# Patient Record
Sex: MALE | Race: ASIAN | NOT HISPANIC OR LATINO | Employment: OTHER | URBAN - METROPOLITAN AREA
[De-identification: names, ages, dates, MRNs, and addresses within clinical notes are randomized per-mention and may not be internally consistent; named-entity substitution may affect disease eponyms.]

---

## 2017-02-03 ENCOUNTER — OFFICE VISIT (OUTPATIENT)
Dept: INTERNAL MEDICINE | Facility: CLINIC | Age: 82
End: 2017-02-03

## 2017-02-03 VITALS
HEART RATE: 66 BPM | BODY MASS INDEX: 25.9 KG/M2 | WEIGHT: 165 LBS | SYSTOLIC BLOOD PRESSURE: 128 MMHG | DIASTOLIC BLOOD PRESSURE: 62 MMHG | OXYGEN SATURATION: 97 % | HEIGHT: 67 IN

## 2017-02-03 DIAGNOSIS — M75.82 ROTATOR CUFF TENDINITIS, LEFT: ICD-10-CM

## 2017-02-03 DIAGNOSIS — Z79.4 TYPE 2 DIABETES MELLITUS WITHOUT COMPLICATION, WITH LONG-TERM CURRENT USE OF INSULIN (HCC): Primary | ICD-10-CM

## 2017-02-03 DIAGNOSIS — Z12.11 SCREEN FOR COLON CANCER: ICD-10-CM

## 2017-02-03 DIAGNOSIS — E11.9 TYPE 2 DIABETES MELLITUS WITHOUT COMPLICATION, WITH LONG-TERM CURRENT USE OF INSULIN (HCC): Primary | ICD-10-CM

## 2017-02-03 LAB
GLUCOSE BLDC GLUCOMTR-MCNC: 193 MG/DL (ref 70–130)
HBA1C MFR BLD: 9.3 %

## 2017-02-03 PROCEDURE — 83036 HEMOGLOBIN GLYCOSYLATED A1C: CPT | Performed by: HOSPITALIST

## 2017-02-03 PROCEDURE — 99214 OFFICE O/P EST MOD 30 MIN: CPT | Performed by: HOSPITALIST

## 2017-02-03 PROCEDURE — 82947 ASSAY GLUCOSE BLOOD QUANT: CPT | Performed by: HOSPITALIST

## 2017-02-03 NOTE — PROGRESS NOTES
"Kelby Munoz is a 81 y.o. male.     HPI Comments: He is doing well. He has been having trouble with his left shoulder not being able to lift his arm and his shoulder is sore.       The following portions of the patient's history were reviewed and updated as appropriate: allergies, current medications, past family history, past medical history, past social history, past surgical history and problem list.    Review of Systems   Constitutional: Negative for activity change and appetite change.   HENT: Negative for congestion and dental problem.    Eyes: Negative for discharge and itching.   Respiratory: Negative for apnea and chest tightness.    Cardiovascular: Negative for chest pain and leg swelling.   Gastrointestinal: Negative for abdominal distention and abdominal pain.   Endocrine: Negative for cold intolerance and heat intolerance.   Genitourinary: Negative for difficulty urinating and dysuria.   Musculoskeletal: Positive for arthralgias.        Right shoulder   Neurological: Negative for dizziness and facial asymmetry.   Hematological: Negative for adenopathy. Does not bruise/bleed easily.   Psychiatric/Behavioral: Negative for agitation and behavioral problems.       Objective  Blood pressure 128/62, pulse 66, height 67\" (170.2 cm), weight 165 lb (74.8 kg), SpO2 97 %.      Physical Exam   Constitutional: He is oriented to person, place, and time. He appears well-developed and well-nourished.   HENT:   Head: Normocephalic and atraumatic.   Eyes: Conjunctivae and EOM are normal. Pupils are equal, round, and reactive to light.   Neck: Normal range of motion.   Cardiovascular: Normal rate, regular rhythm and normal heart sounds.    Pulmonary/Chest: Effort normal and breath sounds normal.   Abdominal: Soft. Bowel sounds are normal.   Musculoskeletal: Normal range of motion.   Neurological: He is alert and oriented to person, place, and time. He has normal reflexes.   Skin: Skin is warm and dry. "   Psychiatric: He has a normal mood and affect. His behavior is normal. Judgment and thought content normal.       Assessment/Plan   Rey was seen today for type 2 diabetes mellitus without complication, with long-ter, essential hypertension and medication question.    Diagnoses and all orders for this visit:    Type 2 diabetes mellitus without complication, with long-term current use of insulin  -     POC Glycosylated Hemoglobin (Hb A1C)  -     POC Glucose Fingerstick    Screen for colon cancer  -     Ambulatory Referral For Screening Colonoscopy    Rotator cuff tendinitis, left  -     Ambulatory Referral to Physical Therapy      Results for orders placed or performed in visit on 02/03/17   POC Glycosylated Hemoglobin (Hb A1C)   Result Value Ref Range    Hemoglobin A1C 9.3 %   POC Glucose Fingerstick   Result Value Ref Range    Glucose 193 (A) 70 - 130 mg/dL

## 2017-02-09 RX ORDER — INSULIN GLARGINE 100 [IU]/ML
INJECTION, SOLUTION SUBCUTANEOUS
Qty: 3 PEN | Refills: 0 | Status: SHIPPED | OUTPATIENT
Start: 2017-02-09 | End: 2017-05-22 | Stop reason: SDUPTHER

## 2017-02-17 ENCOUNTER — HOSPITAL ENCOUNTER (OUTPATIENT)
Dept: PHYSICAL THERAPY | Facility: HOSPITAL | Age: 82
Setting detail: THERAPIES SERIES
Discharge: HOME | End: 2017-02-17
Attending: HOSPITALIST

## 2017-02-17 DIAGNOSIS — M75.82 ROTATOR CUFF TENDONITIS, LEFT: Primary | ICD-10-CM

## 2017-02-17 PROCEDURE — G8984 CARRY CURRENT STATUS: HCPCS

## 2017-02-17 PROCEDURE — G8985 CARRY GOAL STATUS: HCPCS

## 2017-02-17 PROCEDURE — 97162 PT EVAL MOD COMPLEX 30 MIN: CPT | Performed by: PHYSICAL THERAPIST

## 2017-02-17 NOTE — PROGRESS NOTES
Outpatient Physical Therapy Ortho Initial Evaluation  [facility]     Patient Name: Rey Munoz  : 1935  MRN: [id number]  Today's Date: 2017      Visit Date: 2017    Patient Active Problem List   Diagnosis   • CAD (coronary artery disease)   • Ischemic cardiomyopathy   • Hypertension   • Dyslipidemia   • Type 2 diabetes mellitus   • PAD (peripheral artery disease)   • Ataxic gait   • Carotid artery stenosis   • Diabetic peripheral neuropathy   • Hyperlipidemia   • Lumbar radiculopathy        Past Medical History   Diagnosis Date   • Abnormal electrocardiogram    • Acute bronchitis    • Arthritis    • Diabetes mellitus    • H/O gastroesophageal reflux (GERD)    • History of colonoscopy    • History of esophagogastroduodenoscopy    • Hyperlipidemia    • Hypertension    • Vitamin D deficiency    • Weakness of foot         Past Surgical History   Procedure Laterality Date   • Cardiac catheterization  2013     for recurrent angina showed single-vessel disease with 75% stenosis of the proximal LAD.  The rest of the vessels are free from significant disease.  Ejection fraction was 60%. Successful stenting of the proximal LAD with 3.0 x 15 mm Resolute drug-eluting stent with excellent results.   Successful stenting of the proximal LAD with 3.0 x 15 mm Resolute drug-eluting stent with excellent results.      • Inguinal hernia repair       Remote bilateral inguinal hernia repair.   • Carotid endarterectomy        · Onset Date: 2014; Assessed By: Ruth Bhatt (Cardiothoracic Surgery); Last    Assessed: 01 Dec 2014       Visit Dx:     ICD-10-CM ICD-9-CM   1. Rotator cuff tendonitis, left M75.82 726.10             Patient History       17 1100          History    Chief Complaint Pain;Muscle weakness  -ROSEMARY      Type of Pain Shoulder pain   left  -ROSEMARY      Date Current Problem(s) Began 17  -ROSEMARY      Brief Description of Current Complaint This 81 year-old male reports left shoulder pain  for many years.  He is a retired  and he thinks the repetitive use of his arm may have contributed to symptoms.  He says his primary concern is limited motion in his left arm.  -ROSEMARY      Onset Date- PT 2-17-17  -ROSEMARY      Patient/Caregiver Goals Know what to do to help the symptoms;Improve mobility;Improve strength  -ROSEMARY      Hand Dominance right-handed  -ROSEMARY      Pain     Pain Location Shoulder   left  -ROSEMARY      Pain at Present 0  -ROSEMARY      Pain at Best 0  -ROSEMARY      Pain at Worst --   Pt. cannot say.  Primary concern is limited ROM and strength  -ROSEMARY      What Performance Factors Make the Current Problem(s) WORSE? Repeated motion of arm.  -ROSEMARY      What Performance Factors Make the Current Problem(s) BETTER? No pain at rest.  -ROSEMARY      Is your sleep disturbed? No  -ROSEMARY      Safety    Are you being hurt, hit, or frightened by anyone at home or in your life? No  -ROSEMARY      Are you being neglected by a caregiver No  -ROSEMARY        User Key  (r) = Recorded By, (t) = Taken By, (c) = Cosigned By    Initials Name Provider Type    ROSEMARY Sorensen PT Physical Therapist                PT Ortho       02/17/17 1100    Posture/Observations    Posture/Observations Comments Protracted bilateral scapulae  -ROSEMARY    Sensation    Sensation WNL? WNL  -ROSEMARY    Shoulder Girdle Accessory Motions    Posterior glide of humerus Left:;Right:  -ROSEMARY    Inferior glide of humerus Left:;Hypomobile  -ROSEMARY    Shoulder Impingement/Rotator Cuff Special Tests    Neer Impingement Test (RC Lesion vs. Bursitis) Left:;Positive  -ROSEMARY    Drop Arm Test (Full Thickness RC Lesion) Left:;Negative  -ROSEMARY    Belly Press (Subscapularis Lesion) Left:;Negative  -ROSEMARY    Left Shoulder    Flexion AROM Deficit 135  -ROSEMARY    ABduction AROM Deficit 118  -ROSEMARY    External Rotation AROM Deficit wnl  -ROSEMARY    Internal Rotation AROM Deficit approx. 45  -ROSEMARY    Right Shoulder    Flexion AROM Deficit 152  -ROSEMARY    ABduction AROM Deficit 146  -ROSEMARY    External Rotation AROM Deficit wnl  -ROSEMARY    Internal  Rotation AROM Deficit approx. 45  -ROSEMARY    Left Shoulder    Flexion Gross Movement (3/5) fair  -ROSEMARY    ABduction Gross Movement (3/5) fair  -ROSEMARY    Int Rotation Gross Movement (5/5) normal  -ROSEMARY    Ext Rotation Gross Movement (3-/5) fair minus  -ROSEMARY    Right Shoulder    Flexion Gross Movement (3+/5) fair plus  -ROSEMARY    ABduction Gross Movement (4-/5) good minus  -ROSEMARY    Int Rotation Gross Movement (5/5) normal  -ROSEMARY    Ext Rotation Gross Movement (4+/5) good plus  -ROSEMARY      User Key  (r) = Recorded By, (t) = Taken By, (c) = Cosigned By    Initials Name Provider Type    ROSEMARY Sorensen PT Physical Therapist                            Therapy Education       02/17/17 1146    Therapy Education    Given HEP  -ROSEMARY    Program New  -ROSEMARY    How Provided Verbal;Demonstration;Written  -ROSEMARY    Provided to Patient;Caregiver  -ROSEMARY    Level of Understanding Demonstrated  -ROSEMARY      User Key  (r) = Recorded By, (t) = Taken By, (c) = Cosigned By    Initials Name Provider Type    ROSEMARY Sorensen PT Physical Therapist                PT OP Goals       02/17/17 1100          PT Short Term Goals    STG Date to Achieve 03/03/17  -ROSEMARY      STG 1 Pt. demonstrates independence in HEP.  -ROSEMARY      STG 2 Pt. demonstrates L shoulder AROM to WNL's without pain.  -ROSEMARY      Long Term Goals    LTG Date to Achieve 03/17/17  -ROSEMARY      LTG 1 Pt. is independent in self-management of remaining deficits/symptoms.  -ROSEMARY      LTG 2 L shoulder strength is sufficient for ADL performance.  -ROSEMARY      LTG 3 Quick DASH score is improved by 5 points or more.  -ROSEMARY      Time Calculation    PT Goal Re-Cert Due Date 03/17/17  -ROSEMARY        User Key  (r) = Recorded By, (t) = Taken By, (c) = Cosigned By    Initials Name Provider Type    ROSEMARY Sorensen PT Physical Therapist                PT Assessment/Plan       02/17/17 1149          PT Assessment    Functional Limitations Limitations in functional capacity and performance  -ROSEMARY      Impairments Muscle  strength;Pain;Posture;Range of motion  -ROSEMARY      Assessment Comments Pt. presents with limited L shoulder ROM and strength.  He will benefit from PT intervention to address these deficits.  -ROSEMARY      Please refer to paper survey for additional self-reported information Yes  -ROSEMARY      Rehab Potential Good  -ROSEMARY      Patient/caregiver participated in establishment of treatment plan and goals Yes  -ROSEMARY      Patient would benefit from skilled therapy intervention Yes  -ROSEMARY      PT Plan    PT Frequency Other (comment)  -ROSEMARY      Predicted Duration of Therapy Intervention (days/wks) 1 visit per 2 weeks  -ROSEMARY      Planned CPT's? PT EVAL: 62590;PT THER PROC EA 15 MIN: 58226;PT NEUROMUSC RE-EDUCATION EA 15 MIN: 65486;PT MANUAL THERAPY EA 15 MIN: 85387;PT ULTRASOUND EA 15 MIN: 08275  -ROSEMARY      PT Plan Comments PT once per 2 weeks per pt. request.  -ROSEMARY        User Key  (r) = Recorded By, (t) = Taken By, (c) = Cosigned By    Initials Name Provider Type    ROSEMARY Sorensen, PT Physical Therapist                                    Outcome Measures       02/17/17 1100          Quick DASH    Open a tight or new jar. 1  -ROSEMARY      Do heavy household chores (e.g., wash walls, wash floors) 1  -ROSEMARY      Carry a shopping bag or briefcase 1  -ROSEMARY      Wash your back 1  -ROSEMARY      Use a knife to cut food 1  -ROSEMARY      Recreational activities in which you take some force or impact through your arm, should or hand (e.g. golf, hammering, tennis, etc.) 5  -ROSEMARY      During the past week, to what extent has your arm, shoulder, or hand problem interfered with your normal social activites with family, friends, neighbors or groups? 3  -ROSEMARY      During the past week, were you limited in your work or other regular daily activities as a result of your arm, shoulder or hand problem? 4  -ROSEMARY      Arm, Shoulder, or hand pain 2  -ROSEMARY      Tingling (pins and needles) in your arm, shoulder, or hand 1  -ROSEMARY      During the past week, how much difficulty have you had sleeping  because of the pain in your arm, shoulder or hand? 1  -ROSEMARY      Number of Questions Answered 11  -ROSEMARY      Quick DASH Score 22.73  -ROSEMARY      Functional Assessment    Outcome Measure Options Quick DASH  -ROSEMARY        User Key  (r) = Recorded By, (t) = Taken By, (c) = Cosigned By    Initials Name Provider Type    ROSEMARY Sorensen, PT Physical Therapist            Time Calculation:   Start Time: 1100     Therapy Charges for Today     Code Description Service Date Service Provider Modifiers Qty    [id number] HC PT EVAL MOD COMPLEXITY 4 2/17/2017 Danelle Sorensen, PT GP 1          PT G-Codes  Outcome Measure Options: Quick DASH         Danelle Sorensen, PT  2/17/2017

## 2017-02-21 RX ORDER — GLIPIZIDE 2.5 MG/1
2.5 TABLET, EXTENDED RELEASE ORAL DAILY
Qty: 90 TABLET | Refills: 3 | Status: SHIPPED | OUTPATIENT
Start: 2017-02-21 | End: 2018-01-22 | Stop reason: SDUPTHER

## 2017-03-03 ENCOUNTER — APPOINTMENT (OUTPATIENT)
Dept: PHYSICAL THERAPY | Facility: HOSPITAL | Age: 82
End: 2017-03-03

## 2017-03-17 ENCOUNTER — DOCUMENTATION (OUTPATIENT)
Dept: PHYSICAL THERAPY | Facility: HOSPITAL | Age: 82
End: 2017-03-17

## 2017-03-17 ENCOUNTER — HOSPITAL ENCOUNTER (OUTPATIENT)
Dept: PHYSICAL THERAPY | Facility: HOSPITAL | Age: 82
Setting detail: THERAPIES SERIES
End: 2017-03-17

## 2017-03-17 RX ORDER — CLOPIDOGREL BISULFATE 75 MG/1
75 TABLET ORAL DAILY
Qty: 30 TABLET | Refills: 0 | Status: SHIPPED | OUTPATIENT
Start: 2017-03-17 | End: 2017-04-17 | Stop reason: SDUPTHER

## 2017-03-17 NOTE — THERAPY DISCHARGE NOTE
Outpatient Physical Therapy Discharge Summary         Patient Name: Rey Munoz  : 1935  MRN: [id number]    Today's Date: 3/17/2017    Visit Dx:  No diagnosis found.          PT OP Goals       17 1000       PT Short Term Goals    STG Date to Achieve 17  -ROSEMARY     STG 1 Pt. demonstrates independence in HEP.  -ROSEMARY     STG 2 Pt. demonstrates L shoulder AROM to WNL's without pain.  -ROSEMARY     Long Term Goals    LTG Date to Achieve 17  -ROSEMARY     LTG 1 Pt. is independent in self-management of remaining deficits/symptoms.  -ROSEMARY     LTG 2 L shoulder strength is sufficient for ADL performance.  -ROSEMARY     LTG 3 Quick DASH score is improved by 5 points or more.  -ROSEMARY       User Key  (r) = Recorded By, (t) = Taken By, (c) = Cosigned By    Initials Name Provider Type    ROSEMARY Sorensen, PT Physical Therapist          OP PT Discharge Summary  Date of Discharge: 17  Reason for Discharge: other (comment) (DId not return after evaluation)  Outcomes Achieved: Other (Did not return for treatment.)  Discharge Destination: Home with home program      Time Calculation:                    Danelle Sorensen, PT  3/17/2017

## 2017-03-24 ENCOUNTER — APPOINTMENT (OUTPATIENT)
Dept: PHYSICAL THERAPY | Facility: HOSPITAL | Age: 82
End: 2017-03-24

## 2017-03-31 ENCOUNTER — APPOINTMENT (OUTPATIENT)
Dept: PHYSICAL THERAPY | Facility: HOSPITAL | Age: 82
End: 2017-03-31

## 2017-04-04 RX ORDER — CARVEDILOL 12.5 MG/1
12.5 TABLET ORAL 2 TIMES DAILY WITH MEALS
Qty: 60 TABLET | Refills: 5 | Status: SHIPPED | OUTPATIENT
Start: 2017-04-04 | End: 2017-10-31 | Stop reason: SDUPTHER

## 2017-04-17 RX ORDER — CLOPIDOGREL BISULFATE 75 MG/1
75 TABLET ORAL DAILY
Qty: 90 TABLET | Refills: 1 | Status: SHIPPED | OUTPATIENT
Start: 2017-04-17 | End: 2017-09-29 | Stop reason: SDUPTHER

## 2017-05-22 ENCOUNTER — OFFICE VISIT (OUTPATIENT)
Dept: INTERNAL MEDICINE | Facility: CLINIC | Age: 82
End: 2017-05-22

## 2017-05-22 VITALS
SYSTOLIC BLOOD PRESSURE: 124 MMHG | HEIGHT: 67 IN | DIASTOLIC BLOOD PRESSURE: 54 MMHG | OXYGEN SATURATION: 98 % | WEIGHT: 165 LBS | HEART RATE: 68 BPM | BODY MASS INDEX: 25.9 KG/M2

## 2017-05-22 DIAGNOSIS — E11.9 TYPE 2 DIABETES MELLITUS WITHOUT COMPLICATION, WITH LONG-TERM CURRENT USE OF INSULIN (HCC): Primary | ICD-10-CM

## 2017-05-22 DIAGNOSIS — Z79.4 TYPE 2 DIABETES MELLITUS WITHOUT COMPLICATION, WITH LONG-TERM CURRENT USE OF INSULIN (HCC): Primary | ICD-10-CM

## 2017-05-22 DIAGNOSIS — I10 ESSENTIAL HYPERTENSION: ICD-10-CM

## 2017-05-22 DIAGNOSIS — E78.49 OTHER HYPERLIPIDEMIA: ICD-10-CM

## 2017-05-22 DIAGNOSIS — E11.42 DIABETIC PERIPHERAL NEUROPATHY (HCC): ICD-10-CM

## 2017-05-22 LAB
GLUCOSE BLDC GLUCOMTR-MCNC: 151 MG/DL (ref 70–130)
HBA1C MFR BLD: 7.2 %

## 2017-05-22 PROCEDURE — 99214 OFFICE O/P EST MOD 30 MIN: CPT | Performed by: HOSPITALIST

## 2017-05-22 PROCEDURE — 82947 ASSAY GLUCOSE BLOOD QUANT: CPT | Performed by: HOSPITALIST

## 2017-05-22 PROCEDURE — 83036 HEMOGLOBIN GLYCOSYLATED A1C: CPT | Performed by: HOSPITALIST

## 2017-05-22 RX ORDER — AMLODIPINE BESYLATE 5 MG/1
5 TABLET ORAL DAILY
Qty: 90 TABLET | Refills: 1 | Status: SHIPPED | OUTPATIENT
Start: 2017-05-22 | End: 2018-01-22 | Stop reason: SDUPTHER

## 2017-05-22 RX ORDER — ERYTHROMYCIN 5 MG/G
OINTMENT OPHTHALMIC
COMMUNITY
Start: 2017-05-16 | End: 2017-06-16

## 2017-05-22 RX ORDER — AMLODIPINE BESYLATE 10 MG/1
1 TABLET ORAL DAILY
COMMUNITY
Start: 2017-03-28 | End: 2017-05-22 | Stop reason: SDUPTHER

## 2017-06-16 ENCOUNTER — OFFICE VISIT (OUTPATIENT)
Dept: CARDIOLOGY | Facility: CLINIC | Age: 82
End: 2017-06-16

## 2017-06-16 VITALS
SYSTOLIC BLOOD PRESSURE: 128 MMHG | DIASTOLIC BLOOD PRESSURE: 62 MMHG | WEIGHT: 164.4 LBS | BODY MASS INDEX: 25.8 KG/M2 | HEART RATE: 82 BPM | HEIGHT: 67 IN

## 2017-06-16 DIAGNOSIS — I25.10 CORONARY ARTERY DISEASE INVOLVING NATIVE CORONARY ARTERY OF NATIVE HEART WITHOUT ANGINA PECTORIS: Primary | ICD-10-CM

## 2017-06-16 DIAGNOSIS — I10 ESSENTIAL HYPERTENSION: ICD-10-CM

## 2017-06-16 DIAGNOSIS — I25.5 ISCHEMIC CARDIOMYOPATHY: ICD-10-CM

## 2017-06-16 DIAGNOSIS — E78.2 MIXED HYPERLIPIDEMIA: ICD-10-CM

## 2017-06-16 PROCEDURE — 99213 OFFICE O/P EST LOW 20 MIN: CPT | Performed by: INTERNAL MEDICINE

## 2017-06-16 NOTE — PROGRESS NOTES
Encounter Date:06/16/2017      Patient ID: Rey Munoz is a 82 y.o. male.    Chief Complaint: Follow-up (CAD/HTN)    PROBLEM LIST:    1.  Coronary artery disease/ischemic cardiomyopathy:  a. Abnormal Cardiolite  stress test.  b. Cardiac catheterization study by Dr. Vora, 11/26/2012:  Showed single-vessel disease with 80% stenosis of the proximal LAD, nonobstructive disease of the circumflex and RCA with moderate LV dysfunction and EF of 35%.  c. Stenting of  the LAD with 3.0 x 18 mm Promus drug-eluting stent.  d. Echocardiogram, 03/29/2013, showed LVEF improved to 40% to 45%.    e. Cardiac catheterization, 12/19/2013, for recurrent angina showed single-vessel disease with 75% stenosis of the proximal LAD.   The rest of the vessels are free from significant disease.  Ejection fraction was 60%.  f. Successful stenting of the proximal LAD with 3.0 x 15 mm Resolute drug-eluting stent with excellent results.     g. Nuclear Stress 05/17/2016: EF 70%. NL with no evidence of infarction or regadenoson-induced reversible ischemia  h. Echocardiogram 05/17/2016. EF 55-60%. Mild MR, trace TR.  i. SHARRI 05/17/2016 : Positive claudication on the right and negative claudication and left. Post exercise Right extremity changed from 0.82 to .7, and left extremity changed from 0.82 to 0.98.  2. Carotid artery disease:  a. Right carotid bruit.  b. Carotid duplex showed 60% stenosis of the right external carotid artery.  No internal carotid artery disease noted.  c. Status post right CEA.  3. Hypertension.  4. Dyslipidemia.  5. Remote tobacco abuse with cessation 15 years ago.  6. Type 2 diabetes mellitus.  7. Remote bilateral inguinal hernia repair.  8. Arthritis.  9. History of gastroesophageal reflux disease.    History of Present Illness  Patient presents today for follow-up of CAD/ICM and cardiac risk factors. Recently returned from a [place] enjoyed a lot of walking and sightseeing, with no limitations. Wife states he also  suffered a recent URIFrom which she is still recovering. Denies chest pain, shortness of breath, leg swelling, palpitations, and syncope. Remains busy and active walking daily.    No Known Allergies      Current Outpatient Prescriptions:   •  ACCU-CHEK FASTCLIX LANCETS misc, 1 stick 2 (Two) Times a Day., Disp: , Rfl:   •  amLODIPine (NORVASC) 5 MG tablet, Take 1 tablet by mouth Daily., Disp: 90 tablet, Rfl: 1  •  aspirin 81 MG tablet, Take 81 mg by mouth daily, Disp: , Rfl:   •  atorvastatin (LIPITOR) 80 MG tablet, Take 1 tablet by mouth daily., Disp: 90 tablet, Rfl: 3  •  carvedilol (COREG) 12.5 MG tablet, Take 1 tablet by mouth 2 (Two) Times a Day With Meals., Disp: 60 tablet, Rfl: 5  •  clopidogrel (PLAVIX) 75 MG tablet, Take 1 tablet by mouth Daily., Disp: 90 tablet, Rfl: 1  •  glipiZIDE (GLUCOTROL) 2.5 MG 24 hr tablet, Take 1 tablet by mouth Daily., Disp: 90 tablet, Rfl: 3  •  Insulin Glargine (LANTUS SOLOSTAR) 100 UNIT/ML injection pen, Inject 36 Units under the skin Every Night., Disp: 10 pen, Rfl: 2  •  losartan (COZAAR) 100 MG tablet, Take 100 mg by mouth daily, Disp: , Rfl:   •  metFORMIN (GLUCOPHAGE) 1000 MG tablet, Take 0.5 tablets by mouth 2 (Two) Times a Day With Meals. (Patient taking differently: Take 500 mg by mouth Daily.), Disp: 180 tablet, Rfl: 3  •  nitroglycerin (NITROSTAT) 0.4 MG SL tablet, Place 1 tablet under the tongue As Needed., Disp: , Rfl:   •  glucose blood (ACCU-CHEK SMARTVIEW) test strip, 1 strip 2 (Two) Times a Day., Disp: , Rfl:     The following portions of the patient's history were reviewed and updated as appropriate: allergies, current medications, past family history, past medical history, past social history, past surgical history and problem list.    ROS  Review of Systems   Constitution: Negative for chills, fever, weight gain and weight loss.   Cardiovascular: Negative for chest pain, claudication, dyspnea on exertion, leg swelling, orthopnea, palpitations, paroxysmal  "nocturnal dyspnea and syncope.        No dizziness   Gastrointestinal: Negative for abdominal pain, constipation, diarrhea, nausea and vomiting.   Genitourinary:        No urinary symptoms   Neurological:        No symptoms of stroke.   All other systems reviewed and are negative.    Objective:     Blood pressure 128/62, pulse 82, height 67\" (170.2 cm), weight 164 lb 6.4 oz (74.6 kg).      Physical Exam  Constitutional: She appears well-developed and well-nourished.   HENT:   HEENT exam unremarkable.   Neck: Neck supple. No JVD present.   No carotid bruits.   Cardiovascular: Normal rate, regular rhythm and normal heart sounds.    No murmur heard.  2 plus symmetric pulses.   Pulmonary/Chest: Breath sounds normal. Does not exhibit tenderness.   Abdominal:   Abdomen benign.   Musculoskeletal: Does not exhibit edema.   Neurological:   Neurological exam unremarkable.   Vitals reviewed.    Lab Review:   Procedures       Assessment:      Diagnosis Plan   1. Coronary artery disease involving native coronary artery of native heart without angina pectoris     2. Ischemic cardiomyopathy subsequent improvement of LV function, stable/no CHF.      3. Essential hypertension , Well controlled     4. Mixed hyperlipidemia , On statin therapy.        Plan:   Stable from cardiac standpoint.  Continue current medications.   FU in 6 MO, sooner as needed.  Thank you for allowing us to participate in the care of your patient.     Scribed for She Vora MD by Bebeto Glez. 6/16/2017  11:49 AM    I, She Vora MD, personally performed the services described in this documentation as scribed by the above named individual in my presence, and it is both accurate and complete.  6/16/2017  11:50 AM        Please note that portions of this note may have been completed with a voice recognition program. Efforts were made to edit the dictations, but occasionally words are mistranscribed.        "

## 2017-06-28 RX ORDER — ATORVASTATIN CALCIUM 80 MG/1
TABLET, FILM COATED ORAL
Qty: 90 TABLET | Refills: 0 | Status: SHIPPED | OUTPATIENT
Start: 2017-06-28 | End: 2017-08-22 | Stop reason: SDUPTHER

## 2017-06-28 RX ORDER — ATORVASTATIN CALCIUM 80 MG/1
80 TABLET, FILM COATED ORAL DAILY
Qty: 90 TABLET | Refills: 0 | Status: SHIPPED | OUTPATIENT
Start: 2017-06-28 | End: 2017-09-29 | Stop reason: SDUPTHER

## 2017-07-05 RX ORDER — LOSARTAN POTASSIUM 100 MG/1
100 TABLET ORAL DAILY
Status: CANCELLED | OUTPATIENT
Start: 2017-07-05

## 2017-07-06 RX ORDER — LOSARTAN POTASSIUM 100 MG/1
100 TABLET ORAL DAILY
Qty: 90 TABLET | Refills: 1 | Status: SHIPPED | OUTPATIENT
Start: 2017-07-06 | End: 2017-12-31 | Stop reason: SDUPTHER

## 2017-08-22 ENCOUNTER — OFFICE VISIT (OUTPATIENT)
Dept: INTERNAL MEDICINE | Facility: CLINIC | Age: 82
End: 2017-08-22

## 2017-08-22 VITALS
DIASTOLIC BLOOD PRESSURE: 64 MMHG | SYSTOLIC BLOOD PRESSURE: 130 MMHG | BODY MASS INDEX: 25.21 KG/M2 | OXYGEN SATURATION: 98 % | WEIGHT: 160.6 LBS | HEIGHT: 67 IN | HEART RATE: 62 BPM

## 2017-08-22 DIAGNOSIS — E78.2 MIXED HYPERLIPIDEMIA: ICD-10-CM

## 2017-08-22 DIAGNOSIS — E11.9 TYPE 2 DIABETES MELLITUS WITHOUT COMPLICATION, WITH LONG-TERM CURRENT USE OF INSULIN (HCC): ICD-10-CM

## 2017-08-22 DIAGNOSIS — Z79.4 TYPE 2 DIABETES MELLITUS WITHOUT COMPLICATION, WITH LONG-TERM CURRENT USE OF INSULIN (HCC): ICD-10-CM

## 2017-08-22 DIAGNOSIS — I10 ESSENTIAL HYPERTENSION: ICD-10-CM

## 2017-08-22 DIAGNOSIS — E11.42 DIABETIC PERIPHERAL NEUROPATHY (HCC): Primary | ICD-10-CM

## 2017-08-22 LAB
ALBUMIN SERPL-MCNC: 4.5 G/DL (ref 3.2–4.8)
ALBUMIN/GLOB SERPL: 1.6 G/DL (ref 1.5–2.5)
ALP SERPL-CCNC: 62 U/L (ref 25–100)
ALT SERPL W P-5'-P-CCNC: 27 U/L (ref 7–40)
ANION GAP SERPL CALCULATED.3IONS-SCNC: 2 MMOL/L (ref 3–11)
ARTICHOKE IGE QN: 43 MG/DL (ref 0–130)
AST SERPL-CCNC: 22 U/L (ref 0–33)
BILIRUB SERPL-MCNC: 0.8 MG/DL (ref 0.3–1.2)
BUN BLD-MCNC: 23 MG/DL (ref 9–23)
BUN/CREAT SERPL: 23 (ref 7–25)
CALCIUM SPEC-SCNC: 10 MG/DL (ref 8.7–10.4)
CHLORIDE SERPL-SCNC: 106 MMOL/L (ref 99–109)
CHOLEST SERPL-MCNC: 98 MG/DL (ref 0–200)
CO2 SERPL-SCNC: 34 MMOL/L (ref 20–31)
CREAT BLD-MCNC: 1 MG/DL (ref 0.6–1.3)
GFR SERPL CREATININE-BSD FRML MDRD: 72 ML/MIN/1.73
GFR SERPL CREATININE-BSD FRML MDRD: 87 ML/MIN/1.73
GLOBULIN UR ELPH-MCNC: 2.9 GM/DL
GLUCOSE BLD-MCNC: 93 MG/DL (ref 70–100)
GLUCOSE BLDC GLUCOMTR-MCNC: 105 MG/DL (ref 70–130)
HBA1C MFR BLD: 6.4 %
HDLC SERPL-MCNC: 23 MG/DL (ref 40–60)
POTASSIUM BLD-SCNC: 4 MMOL/L (ref 3.5–5.5)
PROT SERPL-MCNC: 7.4 G/DL (ref 5.7–8.2)
SODIUM BLD-SCNC: 142 MMOL/L (ref 132–146)
TRIGL SERPL-MCNC: 151 MG/DL (ref 0–150)

## 2017-08-22 PROCEDURE — 82947 ASSAY GLUCOSE BLOOD QUANT: CPT | Performed by: NURSE PRACTITIONER

## 2017-08-22 PROCEDURE — 83036 HEMOGLOBIN GLYCOSYLATED A1C: CPT | Performed by: NURSE PRACTITIONER

## 2017-08-22 PROCEDURE — 80053 COMPREHEN METABOLIC PANEL: CPT | Performed by: NURSE PRACTITIONER

## 2017-08-22 PROCEDURE — 80061 LIPID PANEL: CPT | Performed by: NURSE PRACTITIONER

## 2017-08-22 PROCEDURE — 99214 OFFICE O/P EST MOD 30 MIN: CPT | Performed by: NURSE PRACTITIONER

## 2017-08-22 NOTE — PROGRESS NOTES
Subjective  Diabetes (F/u visit for type 2 diabetes, blood sugars ranging in the 130's. Pt stated he does not check blood sugars regularly); Hypertension; and Hyperlipidemia      Rey Munoz is a 82 y.o. male.   No Known Allergies  Diabetes   He presents for his follow-up diabetic visit. He has type 2 diabetes mellitus. No MedicAlert identification noted. His disease course has been stable. There are no hypoglycemic associated symptoms. There are no diabetic associated symptoms. There are no hypoglycemic complications. Symptoms are stable. There are no diabetic complications. Pertinent negatives for diabetic complications include no PVD. Risk factors for coronary artery disease include diabetes mellitus, dyslipidemia, hypertension and male sex. Current diabetic treatment includes oral agent (dual therapy). He is compliant with treatment most of the time. His weight is stable. He is following a generally unhealthy diet. When asked about meal planning, he reported none. He has not had a previous visit with a dietitian. He participates in exercise weekly. He sees a podiatrist.Eye exam is current.   Hypertension   This is a chronic problem. The current episode started more than 1 year ago. The problem is controlled. Pertinent negatives include no peripheral edema or shortness of breath. There are no associated agents to hypertension. Risk factors for coronary artery disease include male gender, diabetes mellitus and dyslipidemia. Past treatments include angiotensin blockers, beta blockers and calcium channel blockers. The current treatment provides significant improvement. Compliance problems include diet.  There is no history of PVD or renovascular disease.   Hyperlipidemia   This is a chronic problem. The current episode started more than 1 year ago. Exacerbating diseases include diabetes. He has no history of obesity. Pertinent negatives include no myalgias or shortness of breath. Current antihyperlipidemic treatment  "includes statins. Compliance problems include adherence to diet.  Risk factors for coronary artery disease include diabetes mellitus, dyslipidemia, male sex and hypertension.         Does not check bs regularly   The following portions of the patient's history were reviewed and updated as appropriate: allergies, current medications, past family history, past medical history, past social history, past surgical history and problem list.    Review of Systems   Respiratory: Negative.  Negative for shortness of breath.    Genitourinary: Negative.    Musculoskeletal: Negative for myalgias.   All other systems reviewed and are negative.      Objective   Physical Exam   Constitutional: He is oriented to person, place, and time. He appears well-developed.   HENT:   Head: Normocephalic.   Eyes: Conjunctivae are normal.   Cardiovascular: Normal rate and regular rhythm.    Pulmonary/Chest: Effort normal and breath sounds normal.   Neurological: He is alert and oriented to person, place, and time.   Skin: Skin is warm and dry.   Psychiatric: He has a normal mood and affect. His behavior is normal. Judgment and thought content normal.   Nursing note and vitals reviewed.    /64  Pulse 62  Ht 67\" (170.2 cm)  Wt 160 lb 9.6 oz (72.8 kg)  SpO2 98%  BMI 25.15 kg/m2    Assessment/Plan     Problem List Items Addressed This Visit        Cardiovascular and Mediastinum    Hypertension    Relevant Orders    Comprehensive Metabolic Panel    Hyperlipidemia    Relevant Orders    Lipid Panel       Endocrine    Type 2 diabetes mellitus    Relevant Orders    Comprehensive Metabolic Panel    Diabetic peripheral neuropathy - Primary    Relevant Orders    POC Glycosylated Hemoglobin (Hb A1C) (Completed)    POCT Glucose (Completed)        Results for orders placed or performed in visit on 08/22/17   POC Glycosylated Hemoglobin (Hb A1C)   Result Value Ref Range    Hemoglobin A1C 6.4 %   POCT Glucose   Result Value Ref Range    Glucose 105 70 " - 130 mg/dL       Will review labs and send to pt with results and poc

## 2017-09-29 RX ORDER — CLOPIDOGREL BISULFATE 75 MG/1
75 TABLET ORAL DAILY
Qty: 90 TABLET | Refills: 1 | Status: SHIPPED | OUTPATIENT
Start: 2017-09-29

## 2017-09-29 RX ORDER — ATORVASTATIN CALCIUM 80 MG/1
80 TABLET, FILM COATED ORAL DAILY
Qty: 90 TABLET | Refills: 0 | Status: SHIPPED | OUTPATIENT
Start: 2017-09-29 | End: 2018-03-10 | Stop reason: SDUPTHER

## 2017-10-31 RX ORDER — CARVEDILOL 12.5 MG/1
12.5 TABLET ORAL 2 TIMES DAILY WITH MEALS
Qty: 60 TABLET | Refills: 5 | Status: SHIPPED | OUTPATIENT
Start: 2017-10-31 | End: 2018-01-22 | Stop reason: SDUPTHER

## 2017-12-01 ENCOUNTER — HOSPITAL ENCOUNTER (OUTPATIENT)
Dept: GENERAL RADIOLOGY | Facility: HOSPITAL | Age: 82
Discharge: HOME | End: 2017-12-01
Admitting: NURSE PRACTITIONER

## 2017-12-01 ENCOUNTER — OFFICE VISIT (OUTPATIENT)
Dept: INTERNAL MEDICINE | Facility: CLINIC | Age: 82
End: 2017-12-01

## 2017-12-01 VITALS
HEIGHT: 67 IN | DIASTOLIC BLOOD PRESSURE: 66 MMHG | BODY MASS INDEX: 26.37 KG/M2 | SYSTOLIC BLOOD PRESSURE: 128 MMHG | WEIGHT: 168 LBS | OXYGEN SATURATION: 98 % | HEART RATE: 62 BPM

## 2017-12-01 DIAGNOSIS — M25.511 ACUTE PAIN OF RIGHT SHOULDER: ICD-10-CM

## 2017-12-01 DIAGNOSIS — I10 ESSENTIAL HYPERTENSION: ICD-10-CM

## 2017-12-01 DIAGNOSIS — Z79.4 TYPE 2 DIABETES MELLITUS WITHOUT COMPLICATION, WITH LONG-TERM CURRENT USE OF INSULIN (HCC): Primary | ICD-10-CM

## 2017-12-01 DIAGNOSIS — E11.9 TYPE 2 DIABETES MELLITUS WITHOUT COMPLICATION, WITH LONG-TERM CURRENT USE OF INSULIN (HCC): Primary | ICD-10-CM

## 2017-12-01 LAB
GLUCOSE BLDC GLUCOMTR-MCNC: 304 MG/DL (ref 70–130)
HBA1C MFR BLD: 7.3 %

## 2017-12-01 PROCEDURE — 73030 X-RAY EXAM OF SHOULDER: CPT

## 2017-12-01 PROCEDURE — 82947 ASSAY GLUCOSE BLOOD QUANT: CPT | Performed by: NURSE PRACTITIONER

## 2017-12-01 PROCEDURE — 83036 HEMOGLOBIN GLYCOSYLATED A1C: CPT | Performed by: NURSE PRACTITIONER

## 2017-12-01 PROCEDURE — 99214 OFFICE O/P EST MOD 30 MIN: CPT | Performed by: NURSE PRACTITIONER

## 2017-12-01 NOTE — PROGRESS NOTES
"Subjective  Follow-up (diabetes) and Shoulder Pain (right shoulder. feel off ladder)      Rey Munoz is a 82 y.o. male.   No Known Allergies  History of Present Illness      Taking all meds as ordered, does some walking in the neighborhood, healthy eater  Right shoulder pain x 2 weeks, fell off ladder , constant ache , hurts to raise , has not had any meds to help the pain  The following portions of the patient's history were reviewed and updated as appropriate: allergies, past family history and problem list.    Review of Systems   Musculoskeletal: Positive for arthralgias.   All other systems reviewed and are negative.      Objective   Physical Exam   Constitutional: He is oriented to person, place, and time. He appears well-developed and well-nourished.   HENT:   Head: Normocephalic.   Eyes: Conjunctivae and EOM are normal.   Cardiovascular: Normal rate, regular rhythm and normal heart sounds.    Pulmonary/Chest: Effort normal and breath sounds normal.   Musculoskeletal:        Right shoulder: He exhibits decreased range of motion, tenderness and pain.   Neurological: He is alert and oriented to person, place, and time.   Skin: Skin is warm and dry.   Psychiatric: He has a normal mood and affect. His behavior is normal. Judgment and thought content normal.   Nursing note and vitals reviewed.    /66  Pulse 62  Ht 67\" (170.2 cm)  Wt 168 lb (76.2 kg)  SpO2 98%  BMI 26.31 kg/m2    Assessment/Plan     Problem List Items Addressed This Visit        Cardiovascular and Mediastinum    Hypertension     Hypertension is stable.  Continue current treatment regimen.  Blood pressure will be reassessed at the next regular appointment.            Endocrine    Type 2 diabetes mellitus - Primary    Relevant Orders    POC Glycosylated Hemoglobin (Hb A1C) (Completed)    POC Glucose Fingerstick (Completed)      Other Visit Diagnoses     Acute pain of right shoulder        Relevant Orders    XR Shoulder 2+ View Right    "       Results for orders placed or performed in visit on 12/01/17   POC Glycosylated Hemoglobin (Hb A1C)   Result Value Ref Range    Hemoglobin A1C 7.3 %   POC Glucose Fingerstick   Result Value Ref Range    Glucose 304 (A) 70 - 130 mg/dL     I will call the wife with xray results

## 2017-12-01 NOTE — ASSESSMENT & PLAN NOTE
Hypertension is stable.  Continue current treatment regimen.  Blood pressure will be reassessed at the next regular appointment.

## 2018-01-01 RX ORDER — LOSARTAN POTASSIUM 100 MG/1
TABLET ORAL
Qty: 90 TABLET | Refills: 1 | Status: SHIPPED | OUTPATIENT
Start: 2018-01-01

## 2018-01-04 DIAGNOSIS — E11.9 TYPE 2 DIABETES MELLITUS WITHOUT COMPLICATION, WITH LONG-TERM CURRENT USE OF INSULIN (HCC): ICD-10-CM

## 2018-01-04 DIAGNOSIS — Z79.4 TYPE 2 DIABETES MELLITUS WITHOUT COMPLICATION, WITH LONG-TERM CURRENT USE OF INSULIN (HCC): ICD-10-CM

## 2018-01-04 RX ORDER — BLOOD-GLUCOSE METER
EACH MISCELLANEOUS
Qty: 1 KIT | Refills: 0 | Status: SHIPPED | OUTPATIENT
Start: 2018-01-04

## 2018-01-04 NOTE — TELEPHONE ENCOUNTER
Received fax from [facility] order [facility] requesting refills for lantus, accu-check johnny smartview meter and test strips, med and supplies refilled electronically pt is within protocol.

## 2018-01-22 DIAGNOSIS — I10 ESSENTIAL HYPERTENSION: ICD-10-CM

## 2018-01-22 RX ORDER — CARVEDILOL 12.5 MG/1
12.5 TABLET ORAL 2 TIMES DAILY WITH MEALS
Qty: 60 TABLET | Refills: 5 | Status: SHIPPED | OUTPATIENT
Start: 2018-01-22

## 2018-01-22 RX ORDER — GLIPIZIDE 2.5 MG/1
2.5 TABLET, EXTENDED RELEASE ORAL DAILY
Qty: 90 TABLET | Refills: 3 | Status: SHIPPED | OUTPATIENT
Start: 2018-01-22

## 2018-01-22 RX ORDER — AMLODIPINE BESYLATE 5 MG/1
5 TABLET ORAL DAILY
Qty: 90 TABLET | Refills: 1 | Status: SHIPPED | OUTPATIENT
Start: 2018-01-22 | End: 2018-05-29 | Stop reason: SDUPTHER

## 2018-02-18 ENCOUNTER — HOSPITAL ENCOUNTER (EMERGENCY)
Facility: HOSPITAL | Age: 83
Discharge: HOME | End: 2018-02-18
Attending: EMERGENCY MEDICINE | Admitting: EMERGENCY MEDICINE

## 2018-02-18 VITALS
OXYGEN SATURATION: 97 % | SYSTOLIC BLOOD PRESSURE: 127 MMHG | DIASTOLIC BLOOD PRESSURE: 63 MMHG | WEIGHT: 165 LBS | RESPIRATION RATE: 20 BRPM | TEMPERATURE: 97.2 F | HEIGHT: 67 IN | BODY MASS INDEX: 25.9 KG/M2 | HEART RATE: 65 BPM

## 2018-02-18 DIAGNOSIS — E86.0 DEHYDRATION: ICD-10-CM

## 2018-02-18 DIAGNOSIS — R55 SYNCOPE, UNSPECIFIED SYNCOPE TYPE: Primary | ICD-10-CM

## 2018-02-18 DIAGNOSIS — N17.9 AKI (ACUTE KIDNEY INJURY) (HCC): ICD-10-CM

## 2018-02-18 DIAGNOSIS — R82.71 BACTERIA IN URINE: ICD-10-CM

## 2018-02-18 LAB
ALBUMIN SERPL-MCNC: 4.4 G/DL (ref 3.2–4.8)
ALBUMIN/GLOB SERPL: 1.4 G/DL (ref 1.5–2.5)
ALP SERPL-CCNC: 66 U/L (ref 25–100)
ALT SERPL W P-5'-P-CCNC: 26 U/L (ref 7–40)
ANION GAP SERPL CALCULATED.3IONS-SCNC: 8 MMOL/L (ref 3–11)
AST SERPL-CCNC: 22 U/L (ref 0–33)
BACTERIA UR QL AUTO: ABNORMAL /HPF
BASOPHILS # BLD AUTO: 0.02 10*3/MM3 (ref 0–0.2)
BASOPHILS NFR BLD AUTO: 0.2 % (ref 0–1)
BILIRUB SERPL-MCNC: 1.4 MG/DL (ref 0.3–1.2)
BILIRUB UR QL STRIP: NEGATIVE
BUN BLD-MCNC: 30 MG/DL (ref 9–23)
BUN/CREAT SERPL: 18.8 (ref 7–25)
CALCIUM SPEC-SCNC: 10.4 MG/DL (ref 8.7–10.4)
CHLORIDE SERPL-SCNC: 104 MMOL/L (ref 99–109)
CLARITY UR: CLEAR
CO2 SERPL-SCNC: 28 MMOL/L (ref 20–31)
COLOR UR: YELLOW
CREAT BLD-MCNC: 1.6 MG/DL (ref 0.6–1.3)
DEPRECATED RDW RBC AUTO: 42.3 FL (ref 37–54)
EOSINOPHIL # BLD AUTO: 0.3 10*3/MM3 (ref 0–0.3)
EOSINOPHIL NFR BLD AUTO: 3.7 % (ref 0–3)
ERYTHROCYTE [DISTWIDTH] IN BLOOD BY AUTOMATED COUNT: 12.9 % (ref 11.3–14.5)
GFR SERPL CREATININE-BSD FRML MDRD: 42 ML/MIN/1.73
GFR SERPL CREATININE-BSD FRML MDRD: 51 ML/MIN/1.73
GLOBULIN UR ELPH-MCNC: 3.1 GM/DL
GLUCOSE BLD-MCNC: 152 MG/DL (ref 70–100)
GLUCOSE BLDC GLUCOMTR-MCNC: 142 MG/DL (ref 70–130)
GLUCOSE UR STRIP-MCNC: NEGATIVE MG/DL
HCT VFR BLD AUTO: 40.2 % (ref 38.9–50.9)
HGB BLD-MCNC: 13.6 G/DL (ref 13.1–17.5)
HGB UR QL STRIP.AUTO: NEGATIVE
HOLD SPECIMEN: NORMAL
HOLD SPECIMEN: NORMAL
HYALINE CASTS UR QL AUTO: ABNORMAL /LPF
IMM GRANULOCYTES # BLD: 0.02 10*3/MM3 (ref 0–0.03)
IMM GRANULOCYTES NFR BLD: 0.2 % (ref 0–0.6)
KETONES UR QL STRIP: ABNORMAL
LEUKOCYTE ESTERASE UR QL STRIP.AUTO: ABNORMAL
LYMPHOCYTES # BLD AUTO: 2.74 10*3/MM3 (ref 0.6–4.8)
LYMPHOCYTES NFR BLD AUTO: 34 % (ref 24–44)
MAGNESIUM SERPL-MCNC: 2 MG/DL (ref 1.3–2.7)
MCH RBC QN AUTO: 30.6 PG (ref 27–31)
MCHC RBC AUTO-ENTMCNC: 33.8 G/DL (ref 32–36)
MCV RBC AUTO: 90.5 FL (ref 80–99)
MONOCYTES # BLD AUTO: 0.72 10*3/MM3 (ref 0–1)
MONOCYTES NFR BLD AUTO: 8.9 % (ref 0–12)
MUCOUS THREADS URNS QL MICRO: ABNORMAL /HPF
NEUTROPHILS # BLD AUTO: 4.25 10*3/MM3 (ref 1.5–8.3)
NEUTROPHILS NFR BLD AUTO: 53 % (ref 41–71)
NITRITE UR QL STRIP: NEGATIVE
PH UR STRIP.AUTO: <=5 [PH] (ref 5–8)
PLATELET # BLD AUTO: 228 10*3/MM3 (ref 150–450)
PMV BLD AUTO: 9.8 FL (ref 6–12)
POTASSIUM BLD-SCNC: 4 MMOL/L (ref 3.5–5.5)
PROT SERPL-MCNC: 7.5 G/DL (ref 5.7–8.2)
PROT UR QL STRIP: NEGATIVE
RBC # BLD AUTO: 4.44 10*6/MM3 (ref 4.2–5.76)
RBC # UR: ABNORMAL /HPF
REF LAB TEST METHOD: ABNORMAL
SODIUM BLD-SCNC: 140 MMOL/L (ref 132–146)
SP GR UR STRIP: 1.02 (ref 1–1.03)
SQUAMOUS #/AREA URNS HPF: ABNORMAL /HPF
TROPONIN I SERPL-MCNC: 0.01 NG/ML (ref 0–0.07)
TROPONIN I SERPL-MCNC: 0.02 NG/ML (ref 0–0.07)
UROBILINOGEN UR QL STRIP: ABNORMAL
WBC NRBC COR # BLD: 8.05 10*3/MM3 (ref 3.5–10.8)
WBC UR QL AUTO: ABNORMAL /HPF
WHOLE BLOOD HOLD SPECIMEN: NORMAL
WHOLE BLOOD HOLD SPECIMEN: NORMAL
YEAST URNS QL MICRO: ABNORMAL /HPF

## 2018-02-18 PROCEDURE — 96360 HYDRATION IV INFUSION INIT: CPT

## 2018-02-18 PROCEDURE — 85025 COMPLETE CBC W/AUTO DIFF WBC: CPT | Performed by: EMERGENCY MEDICINE

## 2018-02-18 PROCEDURE — 82962 GLUCOSE BLOOD TEST: CPT

## 2018-02-18 PROCEDURE — 99285 EMERGENCY DEPT VISIT HI MDM: CPT

## 2018-02-18 PROCEDURE — 93005 ELECTROCARDIOGRAM TRACING: CPT | Performed by: EMERGENCY MEDICINE

## 2018-02-18 PROCEDURE — 80053 COMPREHEN METABOLIC PANEL: CPT | Performed by: EMERGENCY MEDICINE

## 2018-02-18 PROCEDURE — 87086 URINE CULTURE/COLONY COUNT: CPT | Performed by: EMERGENCY MEDICINE

## 2018-02-18 PROCEDURE — 81001 URINALYSIS AUTO W/SCOPE: CPT | Performed by: EMERGENCY MEDICINE

## 2018-02-18 PROCEDURE — 93005 ELECTROCARDIOGRAM TRACING: CPT

## 2018-02-18 PROCEDURE — 83735 ASSAY OF MAGNESIUM: CPT | Performed by: EMERGENCY MEDICINE

## 2018-02-18 PROCEDURE — 84484 ASSAY OF TROPONIN QUANT: CPT

## 2018-02-18 RX ORDER — SODIUM CHLORIDE 0.9 % (FLUSH) 0.9 %
10 SYRINGE (ML) INJECTION AS NEEDED
Status: DISCONTINUED | OUTPATIENT
Start: 2018-02-18 | End: 2018-02-18 | Stop reason: HOSPADM

## 2018-02-18 RX ORDER — CEFDINIR 300 MG/1
300 CAPSULE ORAL 2 TIMES DAILY
Qty: 14 CAPSULE | Refills: 0 | Status: SHIPPED | OUTPATIENT
Start: 2018-02-18 | End: 2018-02-25

## 2018-02-18 RX ADMIN — SODIUM CHLORIDE 1000 ML: 9 INJECTION, SOLUTION INTRAVENOUS at 13:42

## 2018-02-18 NOTE — ED PROVIDER NOTES
Subjective   HPI Comments: Mr. Rey Munoz is an 82 year old male who presents to the ED for evaluation s/p syncopal episode. His wife is translating because Mr. Munoz speaks Chinese. He was in [place] this morning when he experienced a sudden onset of dizziness and had one episode of vomiting and. He quickly sat down but is unsure whether or not he actually passed out. He walked to the bathroom soon after the episode and had some bowel incontinence while walking. He denies fever. He had one previous episode of syncope years ago which also occurred in [place] and he had bowel incontinence at that time as well. His blood pressure is reportedly lower than normal here in the ED. His insulin dose was increased 1 week ago but he denies any other medication changes. His most recent A1C was 9.3. He also complains of chronic fatigue for 2 years. There are no other known complaints at this time.          Patient is a 82 y.o. male presenting with syncope.   History provided by:  Patient and spouse  Syncope   Episode history:  Single  Most recent episode:  Today  Chronicity:  Recurrent  Context: bowel movement and standing up    Context: not medication change    Witnessed: yes    Relieved by:  None tried  Worsened by:  Nothing  Ineffective treatments:  None tried  Associated symptoms: dizziness and vomiting (x1)    Associated symptoms: no fever        Review of Systems   Constitutional: Positive for fatigue. Negative for fever.   Respiratory: Negative for cough.    Cardiovascular: Positive for syncope.   Gastrointestinal: Positive for vomiting (x1).   Neurological: Positive for dizziness.   All other systems reviewed and are negative.      Past Medical History:   Diagnosis Date   • Abnormal electrocardiogram    • Acute bronchitis    • Arthritis    • Diabetes mellitus    • H/O gastroesophageal reflux (GERD)    • History of colonoscopy    • History of esophagogastroduodenoscopy    • Hyperlipidemia    • Hypertension    • Vitamin D  deficiency    • Weakness of foot        No Known Allergies    Past Surgical History:   Procedure Laterality Date   • CARDIAC CATHETERIZATION  12/19/2013    for recurrent angina showed single-vessel disease with 75% stenosis of the proximal LAD.  The rest of the vessels are free from significant disease.  Ejection fraction was 60%. Successful stenting of the proximal LAD with 3.0 x 15 mm Resolute drug-eluting stent with excellent results.   Successful stenting of the proximal LAD with 3.0 x 15 mm Resolute drug-eluting stent with excellent results.      • CAROTID ENDARTERECTOMY       · Onset Date: 14 Nov 2014; Assessed By: Ruth Bhatt (Cardiothoracic Surgery); Last    Assessed: 01 Dec 2014   • INGUINAL HERNIA REPAIR      Remote bilateral inguinal hernia repair.       Family History   Problem Relation Age of Onset   • Hyperlipidemia Mother    • Hypertension Mother    • No Known Problems Father        Social History     Social History   • Marital status:      Spouse name: N/A   • Number of children: N/A   • Years of education: N/A     Social History Main Topics   • Smoking status: Former Smoker     Types: Cigarettes   • Smokeless tobacco: Never Used      Comment: quit 30 years ago   • Alcohol use Yes      Comment: rarely   • Drug use: No   • Sexual activity: Defer     Other Topics Concern   • None     Social History Narrative         Objective   Physical Exam   Constitutional: He is oriented to person, place, and time. He appears well-developed and well-nourished. No distress.   HENT:   Head: Normocephalic and atraumatic.   Nose: Nose normal.   Eyes: Conjunctivae are normal. No scleral icterus.   Neck: Normal range of motion. Neck supple.   Cardiovascular: Normal rate, regular rhythm and normal heart sounds.    No murmur heard.  Pulmonary/Chest: Effort normal and breath sounds normal. No respiratory distress.   Abdominal: Soft. Bowel sounds are normal. There is no tenderness.   Musculoskeletal: Normal range of  motion. He exhibits no edema.   Neurological: He is alert and oriented to person, place, and time.   Skin: Skin is warm and dry.   Psychiatric: He has a normal mood and affect. His behavior is normal.   Nursing note and vitals reviewed.      Procedures         ED Course  ED Course   Comment By Time   Patient feel remarkably better and is requesting discharge at this time. Gabriele Mae 02/18 1738   Discussed results. Despite MARCIA and my expressed concern, patient is adamant he prefers discharge at this time and will follow up outpatient. He understands his threshold is very low for return. Joaquin Alvarenga, DO 02/18 1742     Recent Results (from the past 24 hour(s))   POC Glucose Once    Collection Time: 02/18/18 12:54 PM   Result Value Ref Range    Glucose 142 (H) 70 - 130 mg/dL   POC Troponin, Rapid    Collection Time: 02/18/18 12:55 PM   Result Value Ref Range    Troponin I 0.02 0.00 - 0.07 ng/mL   Comprehensive Metabolic Panel    Collection Time: 02/18/18 12:56 PM   Result Value Ref Range    Glucose 152 (H) 70 - 100 mg/dL    BUN 30 (H) 9 - 23 mg/dL    Creatinine 1.60 (H) 0.60 - 1.30 mg/dL    Sodium 140 132 - 146 mmol/L    Potassium 4.0 3.5 - 5.5 mmol/L    Chloride 104 99 - 109 mmol/L    CO2 28.0 20.0 - 31.0 mmol/L    Calcium 10.4 8.7 - 10.4 mg/dL    Total Protein 7.5 5.7 - 8.2 g/dL    Albumin 4.40 3.20 - 4.80 g/dL    ALT (SGPT) 26 7 - 40 U/L    AST (SGOT) 22 0 - 33 U/L    Alkaline Phosphatase 66 25 - 100 U/L    Total Bilirubin 1.4 (H) 0.3 - 1.2 mg/dL    eGFR Non African Amer 42 (L) >60 mL/min/1.73    eGFR  African Amer 51 (L) >60 mL/min/1.73    Globulin 3.1 gm/dL    A/G Ratio 1.4 (L) 1.5 - 2.5 g/dL    BUN/Creatinine Ratio 18.8 7.0 - 25.0    Anion Gap 8.0 3.0 - 11.0 mmol/L   Magnesium    Collection Time: 02/18/18 12:56 PM   Result Value Ref Range    Magnesium 2.0 1.3 - 2.7 mg/dL   Light Blue Top    Collection Time: 02/18/18 12:56 PM   Result Value Ref Range    Extra Tube hold for add-on    Green Top (Gel)     Collection Time: 02/18/18 12:56 PM   Result Value Ref Range    Extra Tube Hold for add-ons.    Lavender Top    Collection Time: 02/18/18 12:56 PM   Result Value Ref Range    Extra Tube hold for add-on    Gold Top - SST    Collection Time: 02/18/18 12:56 PM   Result Value Ref Range    Extra Tube Hold for add-ons.    CBC Auto Differential    Collection Time: 02/18/18 12:56 PM   Result Value Ref Range    WBC 8.05 3.50 - 10.80 10*3/mm3    RBC 4.44 4.20 - 5.76 10*6/mm3    Hemoglobin 13.6 13.1 - 17.5 g/dL    Hematocrit 40.2 38.9 - 50.9 %    MCV 90.5 80.0 - 99.0 fL    MCH 30.6 27.0 - 31.0 pg    MCHC 33.8 32.0 - 36.0 g/dL    RDW 12.9 11.3 - 14.5 %    RDW-SD 42.3 37.0 - 54.0 fl    MPV 9.8 6.0 - 12.0 fL    Platelets 228 150 - 450 10*3/mm3    Neutrophil % 53.0 41.0 - 71.0 %    Lymphocyte % 34.0 24.0 - 44.0 %    Monocyte % 8.9 0.0 - 12.0 %    Eosinophil % 3.7 (H) 0.0 - 3.0 %    Basophil % 0.2 0.0 - 1.0 %    Immature Grans % 0.2 0.0 - 0.6 %    Neutrophils, Absolute 4.25 1.50 - 8.30 10*3/mm3    Lymphocytes, Absolute 2.74 0.60 - 4.80 10*3/mm3    Monocytes, Absolute 0.72 0.00 - 1.00 10*3/mm3    Eosinophils, Absolute 0.30 0.00 - 0.30 10*3/mm3    Basophils, Absolute 0.02 0.00 - 0.20 10*3/mm3    Immature Grans, Absolute 0.02 0.00 - 0.03 10*3/mm3   POC Troponin, Rapid    Collection Time: 02/18/18  3:16 PM   Result Value Ref Range    Troponin I 0.01 0.00 - 0.07 ng/mL   Urinalysis With / Culture If Indicated - Urine, Clean Catch    Collection Time: 02/18/18  3:23 PM   Result Value Ref Range    Color, UA Yellow Yellow, Straw    Appearance, UA Clear Clear    pH, UA <=5.0 5.0 - 8.0    Specific Gravity, UA 1.018 1.001 - 1.030    Glucose, UA Negative Negative    Ketones, UA Trace (A) Negative    Bilirubin, UA Negative Negative    Blood, UA Negative Negative    Protein, UA Negative Negative    Leuk Esterase, UA Trace (A) Negative    Nitrite, UA Negative Negative    Urobilinogen, UA 1.0 E.U./dL 0.2 - 1.0 E.U./dL   Urinalysis, Microscopic Only -  Urine, Clean Catch    Collection Time: 02/18/18  3:23 PM   Result Value Ref Range    RBC, UA 0-2 None Seen, 0-2 /HPF    WBC, UA 0-2 None Seen, 0-2 /HPF    Bacteria, UA 2+ (A) None Seen, Trace /HPF    Squamous Epithelial Cells, UA 3-6 (A) None Seen, 0-2 /HPF    Yeast, UA Small/1+ Budding Yeast None Seen /HPF    Hyaline Casts, UA 7-12 0 - 6 /LPF    Mucus, UA Moderate/2+ (A) None Seen, Trace /HPF    Methodology Manual Light Microscopy      Note: In addition to lab results from this visit, the labs listed above may include labs taken at another facility or during a different encounter within the last 24 hours. Please correlate lab times with ED admission and discharge times for further clarification of the services performed during this visit.    No orders to display     Vitals:    02/18/18 1517 02/18/18 1534 02/18/18 1615 02/18/18 1745   BP: 118/52  119/50 127/63   Patient Position:       Pulse: 65 71 64 65   Resp:       Temp:       TempSrc:       SpO2: 95% 94% 97% 97%   Weight:       Height:         Medications   sodium chloride 0.9 % bolus 1,000 mL (0 mL Intravenous Stopped 2/18/18 1509)     ECG/EMG Results (last 24 hours)     Procedure Component Value Units Date/Time    ECG 12 Lead [[id number]] Collected:  02/18/18 1234     Updated:  02/18/18 1236    ECG 12 Lead [[id number]] Collected:  02/18/18 1509     Updated:  02/18/18 1510                        MDM    Final diagnoses:   Syncope, unspecified syncope type   Bacteria in urine   Dehydration   MARCIA (acute kidney injury)       Documentation assistance provided by taniya Mae.  Information recorded by the scribe was done at my direction and has been verified and validated by me.     Gabriele Mae  02/18/18 1339       Gabriele Mae  02/18/18 1418       Gabriele Mae  02/18/18 1740       Joaquin Alvarenga DO  02/18/18 9179

## 2018-02-20 LAB — BACTERIA SPEC AEROBE CULT: NORMAL

## 2018-03-09 ENCOUNTER — OFFICE VISIT (OUTPATIENT)
Dept: CARDIOLOGY | Facility: CLINIC | Age: 83
End: 2018-03-09

## 2018-03-09 VITALS
DIASTOLIC BLOOD PRESSURE: 70 MMHG | BODY MASS INDEX: 26.43 KG/M2 | HEIGHT: 67 IN | WEIGHT: 168.4 LBS | HEART RATE: 72 BPM | SYSTOLIC BLOOD PRESSURE: 120 MMHG

## 2018-03-09 DIAGNOSIS — I73.9 PAD (PERIPHERAL ARTERY DISEASE) (HCC): ICD-10-CM

## 2018-03-09 DIAGNOSIS — I10 ESSENTIAL HYPERTENSION: ICD-10-CM

## 2018-03-09 DIAGNOSIS — I25.10 CORONARY ARTERY DISEASE INVOLVING NATIVE CORONARY ARTERY OF NATIVE HEART WITHOUT ANGINA PECTORIS: Primary | ICD-10-CM

## 2018-03-09 DIAGNOSIS — E78.2 MIXED HYPERLIPIDEMIA: ICD-10-CM

## 2018-03-09 PROCEDURE — 99213 OFFICE O/P EST LOW 20 MIN: CPT | Performed by: INTERNAL MEDICINE

## 2018-03-09 NOTE — PROGRESS NOTES
Encounter Date:03/09/2018      Patient ID: Rey Munoz is a 82 y.o. male.    Chief Complaint: Coronary Artery Disease and Peripheral artery disease      PROBLEM LIST:  1.  Coronary artery disease/ischemic cardiomyopathy:  a. Abnormal Cardiolite  stress test.  b. Cardiac catheterization study by Dr. Vora, 11/26/2012:  Showed single-vessel disease with 80% stenosis of the proximal LAD, nonobstructive disease of the circumflex and RCA with moderate LV dysfunction and EF of 35%.  c. Stenting of  the LAD with 3.0 x 18 mm Promus drug-eluting stent.  d. Echocardiogram, 03/29/2013, showed LVEF improved to 40% to 45%.    e. Cardiac catheterization, 12/19/2013, for recurrent angina showed single-vessel disease with 75% stenosis of the proximal LAD.   The rest of the vessels are free from significant disease.  Ejection fraction was 60%.  f. Successful stenting of the proximal LAD with 3.0 x 15 mm Resolute drug-eluting stent with excellent results.     g. Nuclear Stress 05/17/2016: EF 70%. NL with no evidence of infarction or regadenoson-induced reversible ischemia  h. Echocardiogram 05/17/2016. EF 55-60%. Mild MR, trace TR.  i. SHARRI 05/17/2016 : Positive claudication on the right and negative claudication and left. Post exercise Right extremity changed from 0.82 to .7, and left extremity changed from 0.82 to 0.98.  2. Carotid artery disease:  a. Right carotid bruit.  b. Carotid duplex showed 60% stenosis of the right external carotid artery.  No internal carotid artery disease noted.  c. Status post right CEA.  3. Hypertension.  4. Dyslipidemia.  5. Remote tobacco abuse with cessation 15 years ago.  6. Type 2 diabetes mellitus.  7. Remote bilateral inguinal hernia repair.  8. Arthritis.  9. History of gastroesophageal reflux disease.    History of Present Illness  Patient presents today for follow-up with a history of CAD, carotid artery disease, cardiac risk factors. Since last visit has been doing well from a cardiac  standpoint. Per wife, he has been eating a lot of beef. Wife inquired if his medication will make him sleepy due to patient falling asleep during the day, but denies snoring at night. PCP has recently increased his insulin bolus due to poor diet control. Denies chest pain, shortness of breath, leg swelling, palpitations, and syncope. Remains busy and active with swimming and walking with no limitations.    No Known Allergies      Current Outpatient Prescriptions:   •  ACCU-CHEK FASTCLIX LANCETS misc, 1 stick 2 (Two) Times a Day., Disp: , Rfl:   •  amLODIPine (NORVASC) 5 MG tablet, Take 1 tablet by mouth Daily., Disp: 90 tablet, Rfl: 1  •  aspirin 81 MG tablet, Take 81 mg by mouth daily, Disp: , Rfl:   •  atorvastatin (LIPITOR) 80 MG tablet, Take 1 tablet by mouth Daily., Disp: 90 tablet, Rfl: 0  •  Blood Glucose Monitoring Suppl (ACCU-CHEK URBAN SMARTVIEW) w/Device kit, Use as directed., Disp: 1 kit, Rfl: 0  •  carvedilol (COREG) 12.5 MG tablet, Take 1 tablet by mouth 2 (Two) Times a Day With Meals., Disp: 60 tablet, Rfl: 5  •  clopidogrel (PLAVIX) 75 MG tablet, Take 1 tablet by mouth Daily., Disp: 90 tablet, Rfl: 1  •  glipiZIDE (GLUCOTROL XL) 2.5 MG 24 hr tablet, Take 1 tablet by mouth Daily., Disp: 90 tablet, Rfl: 3  •  glucose blood (ACCU-CHEK SMARTVIEW) test strip, Use two times daily., Disp: 100 each, Rfl: 2  •  Insulin Glargine (LANTUS SOLOSTAR) 100 UNIT/ML injection pen, Inject 36 Units under the skin Every Night. (Patient taking differently: Inject 40 Units under the skin Every Night.), Disp: 10 pen, Rfl: 2  •  losartan (COZAAR) 100 MG tablet, TAKE ONE TABLET BY MOUTH ONCE DAILY, Disp: 90 tablet, Rfl: 1  •  metFORMIN (GLUCOPHAGE) 1000 MG tablet, Take 0.5 tablets by mouth 2 (Two) Times a Day With Meals. (Patient taking differently: Take 1,000 mg by mouth Daily.), Disp: 180 tablet, Rfl: 3  •  nitroglycerin (NITROSTAT) 0.4 MG SL tablet, Place 1 tablet under the tongue As Needed., Disp: , Rfl:     The following  "portions of the patient's history were reviewed and updated as appropriate: allergies, current medications, past family history, past medical history, past social history, past surgical history and problem list.    ROS  Review of Systems   Constitution: Negative for chills, fever, weight gain and weight loss.   Cardiovascular: Negative for chest pain, claudication, dyspnea on exertion, leg swelling, orthopnea, palpitations, paroxysmal nocturnal dyspnea and syncope.        No dizziness   Gastrointestinal: Negative for abdominal pain, constipation, diarrhea, nausea and vomiting.   Genitourinary:        No urinary symptoms   Neurological:        No symptoms of stroke.   All other systems reviewed and are negative.    Objective:     Blood pressure 120/70, pulse 72, height 170.2 cm (67\"), weight 76.4 kg (168 lb 6.4 oz).      Physical Exam  Constitutional: She appears well-developed and well-nourished.   HENT:   HEENT exam unremarkable.   Neck: Neck supple. No JVD present.   No carotid bruits.   Cardiovascular: Normal rate, regular rhythm and normal heart sounds.    No murmur heard.  2 plus symmetric pulses.   Pulmonary/Chest: Breath sounds normal. Does not exhibit tenderness.   Abdominal:   Abdomen benign.   Musculoskeletal: Does not exhibit edema.   Neurological:   Neurological exam unremarkable.   Vitals reviewed.    Lab Review:   Procedures       Assessment:      Diagnosis Plan   1. Coronary artery disease involving native coronary artery of native heart without angina pectoris  Stable.     2. PAD (peripheral artery disease)     3. Essential hypertension  Well-controlled.  Continue current medication management.     4. Mixed hyperlipidemia. Goal LDL < 70  Continue Lipitor 80 mg.  Monitored by PCP      Plan:   Monitor diet with decreased caloric and red meat intake and increase vegetable intake.  Continue current medications.   FU in 6 MO, sooner as needed.  Thank you for allowing us to participate in the care of your " patient.     Scribed for She Vora MD by Sea Glez. 3/9/2018  3:28 PM    I, She Vora MD, personally performed the services described in this documentation as scribed by the above named individual in my presence, and it is both accurate and complete.  3/9/2018  3:40 PM        Please note that portions of this note may have been completed with a voice recognition program. Efforts were made to edit the dictations, but occasionally words are mistranscribed.

## 2018-03-10 RX ORDER — ATORVASTATIN CALCIUM 80 MG/1
TABLET, FILM COATED ORAL
Qty: 90 TABLET | Refills: 0 | Status: SHIPPED | OUTPATIENT
Start: 2018-03-10 | End: 2019-01-21 | Stop reason: DRUGHIGH

## 2018-05-29 DIAGNOSIS — I10 ESSENTIAL HYPERTENSION: ICD-10-CM

## 2018-05-29 RX ORDER — AMLODIPINE BESYLATE 5 MG/1
TABLET ORAL
Qty: 90 TABLET | Refills: 0 | Status: SHIPPED | OUTPATIENT
Start: 2018-05-29

## 2019-01-21 ENCOUNTER — OFFICE VISIT (OUTPATIENT)
Dept: NEUROLOGY | Facility: CLINIC | Age: 84
End: 2019-01-21

## 2019-01-21 VITALS
DIASTOLIC BLOOD PRESSURE: 62 MMHG | WEIGHT: 165 LBS | BODY MASS INDEX: 25.9 KG/M2 | HEART RATE: 79 BPM | HEIGHT: 67 IN | OXYGEN SATURATION: 96 % | SYSTOLIC BLOOD PRESSURE: 124 MMHG

## 2019-01-21 DIAGNOSIS — F01.50 VASCULAR DEMENTIA WITHOUT BEHAVIORAL DISTURBANCE (HCC): Primary | ICD-10-CM

## 2019-01-21 PROCEDURE — 99204 OFFICE O/P NEW MOD 45 MIN: CPT | Performed by: PSYCHIATRY & NEUROLOGY

## 2019-01-21 RX ORDER — ATORVASTATIN CALCIUM 40 MG/1
40 TABLET, FILM COATED ORAL DAILY
COMMUNITY

## 2019-01-22 NOTE — PROGRESS NOTES
[facility] NEUROLOGY [place] CONSULTATION   History of Present Illness     Date: 1/21/2019    Patient Identification  Rey Munoz is a 83 y.o. male.    Patient information was obtained from patient and spouse/SO.  History/Exam limitations: none.    CONSULTATION requested by: Hudson Guerrero MD    Chief Complaint   Sleep Apnea (NP, in office today for consult. Pt c/p moderate snoring, memory loss and daytime sleepiness )      History of Present Illness   Patient is a pleasant 83-year-old gentleman referred to [facility] neurology [place] for evaluation of progressive cognitive decline.  Patient presented to the office today with his wife reported that his past medical history significant for hypertension, hyperlipidemia, diabetes and coronary artery disease.  His wife notices his cognitive decline since in a stepwise fashion over the last 6 month.  She deny any history of tremor to suggest diffuse Lewy body dementia.  She further denied any frontal release signs or disinhibition to suggest frontal temporal dementia.  There is no movement disorder to such as cortical basal ganglionic degeneration.  There is no family history of Alzheimer's disease.  She notices patient will be asking the same question over and over again .  .      PMH:   Past Medical History:   Diagnosis Date   • Abnormal electrocardiogram    • Acute bronchitis    • Arthritis    • Diabetes mellitus (CMS/HCC)    • H/O gastroesophageal reflux (GERD)    • Heart disease    • History of colonoscopy    • History of esophagogastroduodenoscopy    • Hyperlipidemia    • Hypertension    • Vitamin D deficiency    • Weakness of foot        Past Surgical History:   Past Surgical History:   Procedure Laterality Date   • CARDIAC CATHETERIZATION  12/19/2013    for recurrent angina showed single-vessel disease with 75% stenosis of the proximal LAD.  The rest of the vessels are free from significant disease.  Ejection fraction was 60%. Successful stenting of the  proximal LAD with 3.0 x 15 mm Resolute drug-eluting stent with excellent results.   Successful stenting of the proximal LAD with 3.0 x 15 mm Resolute drug-eluting stent with excellent results.      • CAROTID ENDARTERECTOMY       · Onset Date: 14 Nov 2014; Assessed By: Ruth Bhatt (Cardiothoracic Surgery); Last    Assessed: 01 Dec 2014   • INGUINAL HERNIA REPAIR      Remote bilateral inguinal hernia repair.       Family Hisotry:   Family History   Problem Relation Age of Onset   • Hyperlipidemia Mother    • Hypertension Mother    • Diabetes Mother    • Alcohol abuse Father        Social History:   Social History     Socioeconomic History   • Marital status:      Spouse name: Not on file   • Number of children: Not on file   • Years of education: Not on file   • Highest education level: Not on file   Social Needs   • Financial resource strain: Not on file   • Food insecurity - worry: Not on file   • Food insecurity - inability: Not on file   • Transportation needs - medical: Not on file   • Transportation needs - non-medical: Not on file   Occupational History   • Not on file   Tobacco Use   • Smoking status: Former Smoker     Types: Cigarettes   • Smokeless tobacco: Never Used   • Tobacco comment: quit 30 years ago   Substance and Sexual Activity   • Alcohol use: Yes     Comment: rarely   • Drug use: No   • Sexual activity: Defer   Other Topics Concern   • Not on file   Social History Narrative   • Not on file       Medications:   Current Outpatient Medications   Medication Sig Dispense Refill   • ACCU-CHEK FASTCLIX LANCETS misc 1 stick 2 (Two) Times a Day.     • amLODIPine (NORVASC) 5 MG tablet TAKE 1 TABLET EVERY DAY 90 tablet 0   • aspirin 81 MG tablet Take 81 mg by mouth daily     • atorvastatin (LIPITOR) 40 MG tablet Take 40 mg by mouth Daily.     • Blood Glucose Monitoring Suppl (ACCU-CHEK URBAN SMARTVIEW) w/Device kit Use as directed. 1 kit 0   • carvedilol (COREG) 12.5 MG tablet Take 1 tablet by mouth  2 (Two) Times a Day With Meals. 60 tablet 5   • clopidogrel (PLAVIX) 75 MG tablet Take 1 tablet by mouth Daily. 90 tablet 1   • Empagliflozin (JARDIANCE) 10 MG tablet Take 1 tablet by mouth Daily.     • glipiZIDE (GLUCOTROL XL) 2.5 MG 24 hr tablet Take 1 tablet by mouth Daily. 90 tablet 3   • glucose blood (ACCU-CHEK SMARTVIEW) test strip Use two times daily. 100 each 2   • Insulin Glargine (LANTUS SOLOSTAR) 100 UNIT/ML injection pen Inject 36 Units under the skin Every Night. (Patient taking differently: Inject 40 Units under the skin Every Night.) 10 pen 2   • losartan (COZAAR) 100 MG tablet TAKE ONE TABLET BY MOUTH ONCE DAILY 90 tablet 1   • metFORMIN (GLUCOPHAGE) 1000 MG tablet Take 0.5 tablets by mouth 2 (Two) Times a Day With Meals. (Patient taking differently: Take 1,000 mg by mouth Daily.) 180 tablet 3   • nitroglycerin (NITROSTAT) 0.4 MG SL tablet Place 1 tablet under the tongue As Needed.       No current facility-administered medications for this visit.        Allergy: No Known Allergies    Review of Systems:  Review of Systems   Constitutional: Positive for fatigue. Negative for chills and fever.   HENT: Negative for congestion, ear pain, hearing loss, rhinorrhea and sore throat.    Eyes: Negative for pain, discharge and redness.   Respiratory: Negative for cough, shortness of breath, wheezing and stridor.    Cardiovascular: Negative for chest pain, palpitations and leg swelling.   Gastrointestinal: Negative for abdominal pain, constipation, nausea and vomiting.   Endocrine: Negative for cold intolerance, heat intolerance and polyphagia.   Genitourinary: Negative for dysuria, flank pain, frequency and urgency.   Musculoskeletal: Positive for gait problem. Negative for joint swelling, myalgias, neck pain and neck stiffness.   Skin: Negative for pallor, rash and wound.   Allergic/Immunologic: Negative for environmental allergies.   Neurological: Positive for speech difficulty. Negative for dizziness,  "tremors, seizures, syncope, facial asymmetry, weakness, light-headedness, numbness and headaches.   Hematological: Negative for adenopathy.   Psychiatric/Behavioral: Positive for confusion. Negative for hallucinations. The patient is not nervous/anxious.        Physical Exam     Vitals:    01/21/19 1403   BP: 124/62   Pulse: 79   SpO2: 96%   Weight: 74.8 kg (165 lb)   Height: 170.2 cm (67\")     GENERAL: Patient is pleasant, cooperative, appears to be stated age.  Body habitus is endomorphic.  SKIN AND EXTREMITIES:  No skin rashes or lesions are noted.  No cyanosis, clubbing or edema of the extremities.    HEAD:  Head is normocephalic and atraumatic.    NECK: Nontender without thyromegaly or adenopathy.  Carotid upstrokes are 1+/4.  No cranial or cervical bruits.  The neck is supple with a full range of motion.   ENT: Palate elevates symmetrically.  No evidence of high arch palate.  Tongue midline.  No erythema in posterior pharynx.  Mallampati Classification Class III   CARDIOVASCULAR:  Regular rate and rhythm with normal S1 and S2 without rub or gallop.  RESPIRATORY:  Clear to auscultation without wheezes or crackle   ABDOMEN:  Soft and nontender, positive bowel sound without hepatosplenomegaly  BACK:  Back is straight without midline defect.    PSYCH:  Patient was unable to perform trail making testing .  He has difficulty in copying geometric figure .  He also has tremendous difficulty with clock drawing .  Even though he was able to count but he was unable to put number on the clock face .  Is able to name animals .  He has difficulty in learning 5 object and he was unable to recall any 15 item after 5 minutes   patient has difficulty performing serial 7 he was able to subtract 7 from 100 but after that he was unable to perform the rest of serial 7 .  He was unable to perform abstract thinking .  Is totally disoriented to date he only noted the month but not the year he thought desist 1919 does not know the day " of the week he know the name of the town .  Patient performed very poorly he only scored 7/30 in MOCA testing   SPEECH:There is no gross evidence of aphasia, dysarthria or agnosia.      CRANIAL NERVES:  Pupils are 4mm, equal round reactive to light, reacting briskly to 2mm without afferent pupillary defect.  Visual fields are intact to confrontation testing.  Funduscopic examination reveals sharp disc margins with normal vasculature.  No papilledema, hemorrhages or exudates.  Extraocular movements are full and smooth with normal pursuits and saccades.  No nystagmus noted.  The face is symmetric. Palate elevates symmetrically, Tongue midline, positive gag reflex. The remainder of the cranial nerves are intact and symmetrical.    MOTOR: Strength is 5/5 throughout with normal tone and bulk with the following exceptions, 4/5 intrinsic muscles of the hands and feet.  Foot drop is quite apparent  Deep Tendon Reflexes: are 2/4 and symmetrical in the upper extremities, 2/4 and symmetrical at the knees and 1/4 and symmetrical at the Achilles tendon.  Plantar responses were down-going bilaterally.    SENSATION:  Intact to pinprick, light touch, vibration and proprioception.  Coordination:  The patient normally performs finger-nose-finger, heel-to-knee-to-shin and rapid alternating movements in symmetrical fashion.    COORDINATION AND GAIT:  The patient walks with a narrow-based gait.  Patient is able to heel-toe and tandem walk forward and backwards without difficulty.  Romberg and monopedal  Romberg are negative.    MUSCULOSKELETAL: Range of motion normal, no clubbing, cyanosis, or edema.  No joint swelling.            Records Reviewed: I have personally reviewed his previous medical record.    Rey was seen today for sleep apnea.    Diagnoses and all orders for this visit:    Vascular dementia without behavioral disturbance  -     MRI Brain Without Contrast; Future        Treatments:    Discussion:  In summary,  83-year-old gentleman referred to [facility] neurology [place] for evaluation of progressive cognitive decline.  Patient presented to the office today with his wife reported that his past medical history significant for hypertension, hyperlipidemia, diabetes and coronary artery disease.  His wife notices his cognitive decline since in a stepwise fashion over the last 6 month.  She deny any history of tremor to suggest diffuse Lewy body dementia.  She further denied any frontal release signs or disinhibition to suggest frontal temporal dementia.  There is no movement disorder to such as cortical basal ganglionic degeneration.  There is no family history of Alzheimer's disease.  She notices patient will be asking the same question over and over again     Patient performed very poorly in [place] cognitive assessment.  He scored 7/30 in today visit.  Patient does have significant vascular risks to predisposed him to develop vascular dementia .I would proceed with MRI of the brain performed at [facility] so that Dr. Guerrero will have access to the MRI scan.  Request patient to obtain a copy of the scan in next visit     I have counseled patient and family extensively on various of vascular risks including but not limited to hypertension hyperlipidemia diabetes and coronary artery disease in addition to age.  We have counseled patient to have periodic monitoring of his blood pressure and blood sugar and follow up closely with his family physician and be compliant with his medications.    This Document is signed by Lavon Plaza MD, FAAN, FAASM January 21, 20198:42 PM

## 2019-03-12 ENCOUNTER — TELEPHONE (OUTPATIENT)
Dept: NEUROLOGY | Facility: CLINIC | Age: 84
End: 2019-03-12

## 2019-03-12 DIAGNOSIS — G30.1 LATE ONSET ALZHEIMER'S DISEASE WITHOUT BEHAVIORAL DISTURBANCE (HCC): Primary | ICD-10-CM

## 2019-03-12 DIAGNOSIS — F02.80 LATE ONSET ALZHEIMER'S DISEASE WITHOUT BEHAVIORAL DISTURBANCE (HCC): Primary | ICD-10-CM

## 2019-03-12 NOTE — TELEPHONE ENCOUNTER
Patient called concerning a referral for an MRI.  I did not see an referral in for this patient.    Please Advise

## 2019-03-20 ENCOUNTER — APPOINTMENT (OUTPATIENT)
Dept: MRI IMAGING | Facility: HOSPITAL | Age: 84
End: 2019-03-20
Attending: PSYCHIATRY & NEUROLOGY

## 2019-04-02 ENCOUNTER — APPOINTMENT (OUTPATIENT)
Dept: MRI IMAGING | Facility: HOSPITAL | Age: 84
End: 2019-04-02

## 2019-04-03 ENCOUNTER — HOSPITAL ENCOUNTER (OUTPATIENT)
Dept: MRI IMAGING | Facility: HOSPITAL | Age: 84
Discharge: HOME | End: 2019-04-03
Admitting: PSYCHIATRY & NEUROLOGY

## 2019-04-03 DIAGNOSIS — F02.80 LATE ONSET ALZHEIMER'S DISEASE WITHOUT BEHAVIORAL DISTURBANCE (HCC): ICD-10-CM

## 2019-04-03 DIAGNOSIS — G30.1 LATE ONSET ALZHEIMER'S DISEASE WITHOUT BEHAVIORAL DISTURBANCE (HCC): ICD-10-CM

## 2019-04-03 PROCEDURE — 70551 MRI BRAIN STEM W/O DYE: CPT

## 2019-04-17 ENCOUNTER — TELEPHONE (OUTPATIENT)
Dept: NEUROLOGY | Facility: CLINIC | Age: 84
End: 2019-04-17

## 2019-04-23 ENCOUNTER — OFFICE VISIT (OUTPATIENT)
Dept: NEUROLOGY | Facility: CLINIC | Age: 84
End: 2019-04-23

## 2019-04-23 VITALS
HEIGHT: 67 IN | SYSTOLIC BLOOD PRESSURE: 118 MMHG | OXYGEN SATURATION: 98 % | HEART RATE: 73 BPM | DIASTOLIC BLOOD PRESSURE: 56 MMHG | BODY MASS INDEX: 25.84 KG/M2

## 2019-04-23 DIAGNOSIS — E78.2 MIXED HYPERLIPIDEMIA: ICD-10-CM

## 2019-04-23 DIAGNOSIS — I10 ESSENTIAL HYPERTENSION: ICD-10-CM

## 2019-04-23 DIAGNOSIS — I69.30 SEQUELA OF LACUNAR INFARCTION: ICD-10-CM

## 2019-04-23 DIAGNOSIS — I67.82 SUBCORTICAL MICROVASCULAR ISCHEMIC OCCLUSIVE DISEASE: ICD-10-CM

## 2019-04-23 DIAGNOSIS — F01.50 VASCULAR DEMENTIA WITHOUT BEHAVIORAL DISTURBANCE (HCC): Primary | ICD-10-CM

## 2019-04-23 PROCEDURE — 99214 OFFICE O/P EST MOD 30 MIN: CPT | Performed by: PSYCHIATRY & NEUROLOGY

## 2019-04-23 RX ORDER — EMPAGLIFLOZIN 25 MG/1
1 TABLET, FILM COATED ORAL DAILY
COMMUNITY
Start: 2019-03-18

## 2019-04-24 NOTE — PROGRESS NOTES
[facility] NEUROLOGY [place] CONSULTATION   History of Present Illness     Date: 4/23/2019    Patient Identification  Rey Munoz is a 83 y.o. male.    Patient information was obtained from patient and spouse/SO.  History/Exam limitations: none.    CONSULTATION requested by: Hudson Joseph MD    Chief Complaint   Results (Pt in office today to review results of MRI Brain )      History of Present Illness   Patient is a pleasant 83-year-old referred to [facility] for evaluation of progressive cognitive decline.  Patient has significant past medical history for hypertension, hyperlipidemia, diabetes, and coronary artery disease.  His wife noticed his cognitive decline over the last 6 months in a stepwise fashion.  Patient denies any history of tremor or shuffling gait to suggest diffuse Lewy body dementia patient denies any frontal release signs or disinhibition to suggest frontotemporal dementia.  There is no rigidity or alien limb syndrome to suggest cortical basal ganglionic degeneration.  Patient underwent MRI of the brain which show chronic lacunar infarct and extensive microvascular ischemic changes associated with global atrophy.  This MRI finding is most consistent with vascular dementia.  I have once again discussed extensively today with the patient and his wife the importance of controlling his blood pressure and periodic monitoring of his blood sugar and lipid level and the importance of follow-up closely with his cardiologist and family physician.      PMH:   Past Medical History:   Diagnosis Date   • Abnormal electrocardiogram    • Acute bronchitis    • Arthritis    • Diabetes mellitus (CMS/HCC)    • H/O gastroesophageal reflux (GERD)    • Heart disease    • History of colonoscopy    • History of esophagogastroduodenoscopy    • Hyperlipidemia    • Hypertension    • Vitamin D deficiency    • Weakness of foot        Past Surgical History:   Past Surgical History:   Procedure Laterality  Date   • CARDIAC CATHETERIZATION  12/19/2013    for recurrent angina showed single-vessel disease with 75% stenosis of the proximal LAD.  The rest of the vessels are free from significant disease.  Ejection fraction was 60%. Successful stenting of the proximal LAD with 3.0 x 15 mm Resolute drug-eluting stent with excellent results.   Successful stenting of the proximal LAD with 3.0 x 15 mm Resolute drug-eluting stent with excellent results.      • CAROTID ENDARTERECTOMY       · Onset Date: 14 Nov 2014; Assessed By: Ruth Bhatt (Cardiothoracic Surgery); Last    Assessed: 01 Dec 2014   • INGUINAL HERNIA REPAIR      Remote bilateral inguinal hernia repair.       Family Hisotry:   Family History   Problem Relation Age of Onset   • Hyperlipidemia Mother    • Hypertension Mother    • Diabetes Mother    • Alcohol abuse Father        Social History:   Social History     Socioeconomic History   • Marital status:      Spouse name: Not on file   • Number of children: Not on file   • Years of education: Not on file   • Highest education level: Not on file   Tobacco Use   • Smoking status: Former Smoker     Types: Cigarettes   • Smokeless tobacco: Never Used   • Tobacco comment: quit 30 years ago   Substance and Sexual Activity   • Alcohol use: Yes     Comment: rarely   • Drug use: No   • Sexual activity: Defer       Medications:   Current Outpatient Medications   Medication Sig Dispense Refill   • ACCU-CHEK FASTCLIX LANCETS misc 1 stick 2 (Two) Times a Day.     • amLODIPine (NORVASC) 5 MG tablet TAKE 1 TABLET EVERY DAY 90 tablet 0   • aspirin 81 MG tablet Take 81 mg by mouth daily     • atorvastatin (LIPITOR) 40 MG tablet Take 40 mg by mouth Daily.     • Blood Glucose Monitoring Suppl (ACCU-CHEK URBAN SMARTVIEW) w/Device kit Use as directed. 1 kit 0   • carvedilol (COREG) 12.5 MG tablet Take 1 tablet by mouth 2 (Two) Times a Day With Meals. 60 tablet 5   • clopidogrel (PLAVIX) 75 MG tablet Take 1 tablet by mouth Daily.  90 tablet 1   • glipiZIDE (GLUCOTROL XL) 2.5 MG 24 hr tablet Take 1 tablet by mouth Daily. 90 tablet 3   • glucose blood (ACCU-CHEK SMARTVIEW) test strip Use two times daily. 100 each 2   • Insulin Glargine (LANTUS SOLOSTAR) 100 UNIT/ML injection pen Inject 36 Units under the skin Every Night. (Patient taking differently: Inject 40 Units under the skin Every Night.) 10 pen 2   • JARDIANCE 25 MG tablet 1 tablet Daily.     • losartan (COZAAR) 100 MG tablet TAKE ONE TABLET BY MOUTH ONCE DAILY 90 tablet 1   • metFORMIN (GLUCOPHAGE) 1000 MG tablet Take 0.5 tablets by mouth 2 (Two) Times a Day With Meals. (Patient taking differently: Take 1,000 mg by mouth Daily.) 180 tablet 3   • nitroglycerin (NITROSTAT) 0.4 MG SL tablet Place 1 tablet under the tongue As Needed.       No current facility-administered medications for this visit.        Allergy: No Known Allergies    Review of Systems:  Review of Systems   Constitutional: Positive for fatigue. Negative for chills and fever.   HENT: Negative for congestion, ear pain, hearing loss, rhinorrhea and sore throat.    Eyes: Negative for pain, discharge and redness.   Respiratory: Negative for cough, shortness of breath, wheezing and stridor.    Cardiovascular: Negative for chest pain, palpitations and leg swelling.   Gastrointestinal: Negative for abdominal pain, constipation, nausea and vomiting.   Endocrine: Negative for cold intolerance, heat intolerance and polyphagia.   Genitourinary: Negative for dysuria, flank pain, frequency and urgency.   Musculoskeletal: Negative for joint swelling, myalgias, neck pain and neck stiffness.   Skin: Negative for pallor, rash and wound.   Allergic/Immunologic: Negative for environmental allergies.   Neurological: Negative for dizziness, tremors, seizures, syncope, facial asymmetry, speech difficulty, weakness, light-headedness, numbness and headaches.   Hematological: Negative for adenopathy.   Psychiatric/Behavioral: Positive for  "behavioral problems, confusion, decreased concentration and sleep disturbance. Negative for hallucinations. The patient is not nervous/anxious.        Physical Exam     Vitals:    04/23/19 1438   BP: 118/56   Pulse: 73   SpO2: 98%   Height: 170.2 cm (67\")     GENERAL: Patient is pleasant, cooperative, appears to be stated age.  Body habitus is endomorphic.  SKIN AND EXTREMITIES:  No skin rashes or lesions are noted.  No cyanosis, clubbing or edema of the extremities.    HEAD:  Head is normocephalic and atraumatic.    NECK: Nontender without thyromegaly or adenopathy.  Carotid upstrokes are 1+/4.  No cranial or cervical bruits.  The neck is supple with a full range of motion.   ENT: Palate elevates symmetrically.  No evidence of high arch palate.  Tongue midline.  No erythema in posterior pharynx.  Mallampati Classification Class III   CARDIOVASCULAR:  Regular rate and rhythm with normal S1 and S2 without rub or gallop.  RESPIRATORY:  Clear to auscultation without wheezes or crackle   ABDOMEN:  Soft and nontender, positive bowel sound without hepatosplenomegaly  BACK:  Back is straight without midline defect.    PSYCH: Patient was unable to perform Trail making testing patient also has tremendous difficulty with clock drawing .  He has significant difficulty with copying geometric figure .  He was unable to learn 5 object but he is able to name 3 animals .  He has tremendous difficulty performing serial 7 .  He was unable to perform abstract thinking .  He is completely disoriented to date and only noted the month but not the year or the day of the week he will know the name of the town .  Patient score 6/30 in MOCA testing   sPEECH:There is no gross evidence of aphasia, dysarthria or agnosia.      CRANIAL NERVES:  Pupils are 4mm, equal round reactive to light, reacting briskly to 2mm without afferent pupillary defect.  Visual fields are intact to confrontation testing.  Funduscopic examination reveals sharp disc " margins with normal vasculature.  No papilledema, hemorrhages or exudates.  Extraocular movements are full and smooth with normal pursuits and saccades.  No nystagmus noted.  The face is symmetric. Palate elevates symmetrically, Tongue midline, positive gag reflex. The remainder of the cranial nerves are intact and symmetrical.    MOTOR: Strength is 5/5 throughout with normal tone and bulk with the following exceptions, 4/5 intrinsic muscles of the hands and feet.  Foot drop is quite apparent  Deep Tendon Reflexes: are 2/4 and symmetrical in the upper extremities, 2/4 and symmetrical at the knees and 1/4 and symmetrical at the Achilles tendon.  Plantar responses were down-going bilaterally.    SENSATION:  Intact to pinprick, light touch, vibration and proprioception.  Coordination:  The patient normally performs finger-nose-finger, heel-to-knee-to-shin and rapid alternating movements in symmetrical fashion.    COORDINATION AND GAIT: Patient ambulates with a steppage gait         Studies: I have personally reviewed the following and discussed with the patient.    Review of Imaging: I have personally reviewed the following images and discussed with the patient.  MRI of the brain show multiple lacunar infarcts with extensive microvascular ischemic changes and global atrophy    Records Reviewed: I have personally reviewed his previous medical record.    Rey was seen today for results.    Diagnoses and all orders for this visit:    Vascular dementia without behavioral disturbance    Essential hypertension    Mixed hyperlipidemia    Sequela of lacunar infarction    Subcortical microvascular ischemic occlusive disease        Treatments:    Discussion:  In summary, Patient was unable to perform trail making testing .  He has difficulty in copying geometric figure .  He also has tremendous difficulty with clock drawing .  Even though he was able to count but he was unable to put number on the clock face .  Is able to name  animals .  He has difficulty in learning 5 object and he was unable to recall any 15 item after 5 minutes   patient has difficulty performing serial 7 he was able to subtract 7 from 100 but after that he was unable to perform the rest of serial 7 .  He was unable to perform abstract thinking .  Is totally disoriented to date he only noted the month but not the year he thought desist 1919 does not know the day of the week he know the name of the town .  Patient performed very poorly he only scored 7/30 in MOCA testing . 83-year-old referred to [facility] neurology [facility] for evaluation of progressive cognitive decline.  Patient has significant past medical history for hypertension, hyperlipidemia, diabetes, and coronary artery disease.  His wife noticed his cognitive decline over the last 6 months in a stepwise fashion.  Patient denies any history of tremor or shuffling gait to suggest diffuse Lewy body dementia patient denies any frontal release signs or disinhibition to suggest frontotemporal dementia.  There is no rigidity or alien limb syndrome to suggest cortical basal ganglionic degeneration.  Patient underwent MRI of the brain which show chronic lacunar infarct and extensive microvascular ischemic changes associated with global atrophy.  This MRI finding is most consistent with vascular dementia.  I have once again discussed extensively today with the patient and his wife the importance of controlling his blood pressure and periodic monitoring of his blood sugar and lipid level and the importance of follow-up closely with his cardiologist and family physician.       This Document is signed by Lavon Plaza MD, FAAN, FAASM April 23, 20199:31 PM